# Patient Record
Sex: FEMALE | Race: WHITE | NOT HISPANIC OR LATINO | Employment: PART TIME | URBAN - METROPOLITAN AREA
[De-identification: names, ages, dates, MRNs, and addresses within clinical notes are randomized per-mention and may not be internally consistent; named-entity substitution may affect disease eponyms.]

---

## 2017-04-20 ENCOUNTER — HOSPITAL ENCOUNTER (OUTPATIENT)
Dept: MAMMOGRAPHY | Facility: MEDICAL CENTER | Age: 47
Discharge: HOME | End: 2017-04-20
Payer: COMMERCIAL

## 2017-04-20 DIAGNOSIS — Z12.31 SCREENING MAMMOGRAM, ENCOUNTER FOR: ICD-10-CM

## 2017-04-20 DIAGNOSIS — E78.00 PURE HYPERCHOLESTEROLEMIA: ICD-10-CM

## 2017-04-20 DIAGNOSIS — E55.9 VITAMIN D DEFICIENCY: ICD-10-CM

## 2017-04-20 DIAGNOSIS — Z12.31 ENCOUNTER FOR SCREENING MAMMOGRAM FOR MALIGNANT NEOPLASM OF BREAST: ICD-10-CM

## 2017-04-20 DIAGNOSIS — R92.2 INCONCLUSIVE MAMMOGRAM: ICD-10-CM

## 2017-04-20 PROCEDURE — 77063 BREAST TOMOSYNTHESIS BI: CPT

## 2017-04-20 PROCEDURE — G0202 SCR MAMMO BI INCL CAD: HCPCS

## 2017-05-10 ENCOUNTER — ALLSCRIPTS OFFICE VISIT (OUTPATIENT)
Dept: OTHER | Facility: OTHER | Age: 47
End: 2017-05-10

## 2017-05-10 PROCEDURE — 87624 HPV HI-RISK TYP POOLED RSLT: CPT | Performed by: OBSTETRICS & GYNECOLOGY

## 2017-05-10 PROCEDURE — G0145 SCR C/V CYTO,THINLAYER,RESCR: HCPCS | Performed by: OBSTETRICS & GYNECOLOGY

## 2017-05-11 ENCOUNTER — LAB REQUISITION (OUTPATIENT)
Dept: LAB | Facility: HOSPITAL | Age: 47
End: 2017-05-11
Payer: COMMERCIAL

## 2017-05-11 DIAGNOSIS — Z11.51 ENCOUNTER FOR SCREENING FOR HUMAN PAPILLOMAVIRUS (HPV): ICD-10-CM

## 2017-05-15 LAB — HPV RRNA GENITAL QL NAA+PROBE: NORMAL

## 2017-05-16 LAB
LAB AP GYN PRIMARY INTERPRETATION: NORMAL
Lab: NORMAL

## 2017-08-16 ENCOUNTER — GENERIC CONVERSION - ENCOUNTER (OUTPATIENT)
Dept: OTHER | Facility: OTHER | Age: 47
End: 2017-08-16

## 2018-01-09 NOTE — MISCELLANEOUS
Message   Recorded as Task   Date: 08/23/2017 03:36 PM, Created By: Krysta Chu   Task Name: Follow Up   Assigned To: Juliocesar Pierre   Regarding Patient: Jered Crockett, Status: In Progress   RonaldClair Rut - 23 Aug 2017 3:36 PM     TASK CREATED  Inform pt vitamin D level WNL  Jamila Ashford continue supplementation  Cholesterol only slightly elevated(good cholesterol-HDL is above average)  Continue low-fat diet, exercise  Will repeat lipid profile after next annual visit  Sally Resendez - 23 Aug 2017 3:38 PM     TASK IN PROGRESS   Sally Resendez - 23 Aug 2017 3:41 PM     TASK EDITED  LMOM to cb for b/w results  ext: Tory Murcia - 24 Aug 2017 4:15 PM     TASK EDITED  LMOM to cb for results  ext: Joan8   Alee Rojas - 25 Aug 2017 7:58 AM     TASK EDITED  lm for pt with rk's instructions    tcb with any questions        Active Problems    1  Anxiety (300 00) (F41 9)   2  Dense breasts (793 82) (R92 2)   3  Encounter for gynecological examination without abnormal finding (V72 31) (Z01 419)   4  Encounter for routine gynecological examination (V72 31) (Z01 419)   5  Encounter for screening mammogram for malignant neoplasm of breast (V76 12)   (Z12 31)   6  Fatigue (780 79) (R53 83)   7  Hypercholesterolemia (272 0) (E78 00)   8  Screening for human papillomavirus (HPV) (V73 81) (Z11 51)   9  Vitamin D deficiency (268 9) (E55 9)    Current Meds   1  ALPRAZolam 0 5 MG Oral Tablet; TAKE 1 TABLET BY MOUTH 3 TIMES A DAY AS   NEEDED; Therapy: [id number] to (Evaluate:59Bza1429); Last Rx:10May2017 Ordered   2  [facility] Fish Oil 1000 MG Oral Capsule; Therapy: ([place]) to Recorded   3  Magnesium 300 MG Oral Capsule; TAKE 1 CAPSULE DAILY; Therapy: (Recorded:08Apr2015) to Recorded   4  Probiotic CAPS; Therapy: ([place]) to Recorded   5  Xanax 0 5 MG Oral Tablet (ALPRAZolam); TAKE 1 TABLET DAILY AS NEEDED; Therapy: (Recorded:08Apr2015) to Recorded    Allergies    1   Vicodin ES TABS    Signatures   Electronically signed by : Ben Onofre, ; Aug 25 2017  7:59AM EST                       (Author)

## 2018-01-13 VITALS
BODY MASS INDEX: 25.66 KG/M2 | WEIGHT: 154 LBS | DIASTOLIC BLOOD PRESSURE: 98 MMHG | HEIGHT: 65 IN | SYSTOLIC BLOOD PRESSURE: 142 MMHG

## 2018-01-15 NOTE — MISCELLANEOUS
Message   Recorded as Task   Date: 04/08/2016 09:28 AM, Created By: Gilbert Kim   Task Name: Follow Up   Assigned To: Arnaldo Anders   Regarding Patient: Eren Cabrera, Status: In Progress   Abrahamleathacolt Crossett - 08 Apr 2016 9:28 AM     TASK CREATED  inform pt cholesterol elevated, vitamin D low    begin vit D3 2000IU daily, start low- fat diet   send order to repeat lipid profile, vit D 6 months   Jeanna Rojas - 08 Apr 2016 9:34 AM     TASK IN PROGRESS   Jeanna Rojas - 08 Apr 2016 9:37 AM     TASK EDITED  lm for pt tcb for results and further instructions    order placed in allscripts for 6 month f/u lipid and vit d   Jeanna Rojas - 08 Apr 2016 9:43 AM     TASK EDITED  lab slip mailed to pt   BobDiane - 08 Apr 2016 10:54 AM     TASK EDITED  pt iinformed        Active Problems    1  Anxiety (300 00) (F41 9)   2  Encounter for routine gynecological examination (V72 31) (Z01 419)   3  Encounter for screening mammogram for malignant neoplasm of breast (V76 12)   (Z12 31)   4  Fatigue (780 79) (R53 83)   5  Hypercholesterolemia (272 0) (E78 0)   6  Screening for human papillomavirus (HPV) (V73 81) (Z11 51)   7  Vitamin D deficiency (268 9) (E55 9)    Current Meds   1  ALPRAZolam 0 5 MG Oral Tablet; TAKE ONE TABLET BY MOUTH THREE TIMES DAILY   AS NEEDED; Therapy: [id number] to (Evaluate:08Nov2015); Last Rx:[id number] Ordered   2  [facility] Fish Oil 1000 MG Oral Capsule; Therapy: (Mekhi Veras) to Recorded   3  Magnesium 300 MG Oral Capsule; TAKE 1 CAPSULE DAILY; Therapy: (Recorded:08Apr2015) to Recorded   4  Probiotic CAPS; Therapy: (Mekhi Veras) to Recorded   5  Xanax 0 5 MG Oral Tablet (ALPRAZolam); TAKE 1 TABLET DAILY AS NEEDED; Therapy: (Recorded:08Apr2015) to Recorded   6  Xanax 1 MG Oral Tablet (ALPRAZolam); Therapy: (Recorded:27Mar2013) to Recorded    Allergies    1   Vicodin ES TABS    Signatures   Electronically signed by : Jones Giordano, ; Apr 8 2016 10:54AM EST (Author)

## 2018-04-20 DIAGNOSIS — Z12.31 ENCOUNTER FOR SCREENING MAMMOGRAM FOR MALIGNANT NEOPLASM OF BREAST: ICD-10-CM

## 2018-05-14 ENCOUNTER — ANNUAL EXAM (OUTPATIENT)
Dept: OBGYN CLINIC | Facility: CLINIC | Age: 48
End: 2018-05-14
Payer: COMMERCIAL

## 2018-05-14 ENCOUNTER — TELEPHONE (OUTPATIENT)
Dept: OBGYN CLINIC | Facility: CLINIC | Age: 48
End: 2018-05-14

## 2018-05-14 VITALS
BODY MASS INDEX: 26.06 KG/M2 | DIASTOLIC BLOOD PRESSURE: 80 MMHG | HEIGHT: 65 IN | WEIGHT: 156.4 LBS | SYSTOLIC BLOOD PRESSURE: 124 MMHG

## 2018-05-14 DIAGNOSIS — Z01.411 ENCOUNTER FOR GYNECOLOGICAL EXAMINATION WITH ABNORMAL FINDING: Primary | ICD-10-CM

## 2018-05-14 DIAGNOSIS — R53.83 FATIGUE, UNSPECIFIED TYPE: ICD-10-CM

## 2018-05-14 DIAGNOSIS — F41.9 ANXIETY: Primary | ICD-10-CM

## 2018-05-14 DIAGNOSIS — Z12.31 ENCOUNTER FOR SCREENING MAMMOGRAM FOR MALIGNANT NEOPLASM OF BREAST: ICD-10-CM

## 2018-05-14 PROCEDURE — 99396 PREV VISIT EST AGE 40-64: CPT | Performed by: OBSTETRICS & GYNECOLOGY

## 2018-05-14 RX ORDER — ALPRAZOLAM 0.5 MG/1
0.5 TABLET ORAL 3 TIMES DAILY PRN
Qty: 30 TABLET | Refills: 0 | Status: SHIPPED | OUTPATIENT
Start: 2018-05-14 | End: 2020-11-18 | Stop reason: HOSPADM

## 2018-05-14 RX ORDER — ALPRAZOLAM 0.5 MG/1
1 TABLET ORAL 3 TIMES DAILY PRN
COMMUNITY
Start: 2013-08-21 | End: 2018-05-14 | Stop reason: SDUPTHER

## 2018-05-14 RX ORDER — MAGNESIUM OXIDE/MAG AA CHELATE 300 MG
1 CAPSULE ORAL DAILY
COMMUNITY

## 2018-05-14 NOTE — TELEPHONE ENCOUNTER
Pt saw Dr Irish Peoples today for yearly - she forgot  To request rx for xanax for a year  The doctor usually orders it for her  [facility] [address]

## 2018-05-14 NOTE — PROGRESS NOTES
Assessment/Plan:    No problem-specific Assessment & Plan notes found for this encounter  Diagnoses and all orders for this visit:    Encounter for screening mammogram for malignant neoplasm of breast  -     Mammo screening bilateral w 3d & cad; Future  -     TSH, 3rd generation    Fatigue, unspecified type  -     CBC  -     Lipid panel; Future    Encounter for gynecological examination with abnormal finding    Other orders  -     ALPRAZolam (XANAX) 0 5 mg tablet; Take 1 tablet by mouth 3 (three) times a day as needed  -     Magnesium 300 MG CAPS; Take 1 capsule by mouth daily  -     Probiotic Product (PROBIOTIC-10) CAPS; Take by mouth        Annual examination was completed  Mammogram was ordered  We discussed laboratory studies to obtain for her menses  She was also offered a trial of tranexamic acid she declines at this time she will think about it  Patient otherwise return to the office in 1 year pending above results  Subjective:      Patient ID: Jenna Arauz is a 41 y o  female  Patient returns for annual gyn visit  She continues to note menses that are well time but heavy in flow on days 1 and 2  She has some fatigue  She is not interested in any interventions to help with her menses  Gynecologic Exam         The following portions of the patient's history were reviewed and updated as appropriate:   She  has a past medical history of Depression and Mitral valve disorder  She   Patient Active Problem List    Diagnosis Date Noted    Encounter for screening mammogram for malignant neoplasm of breast 2018    Fatigue 2018    Encounter for gynecological examination with abnormal finding 2018     She  has a past surgical history that includes  section; Induced ; and Tonsillectomy  Her family history includes Heart disease in her family; Hypertension in her family; No Known Problems in her mother; Tuberculosis in her family    She  reports that she has never smoked  She has never used smokeless tobacco  She reports that she drinks alcohol  Her drug history is not on file  Current Outpatient Prescriptions   Medication Sig Dispense Refill    ALPRAZolam (XANAX) 0 5 mg tablet Take 1 tablet by mouth 3 (three) times a day as needed      Magnesium 300 MG CAPS Take 1 capsule by mouth daily      Probiotic Product (PROBIOTIC-10) CAPS Take by mouth       No current facility-administered medications for this visit  No current outpatient prescriptions on file prior to visit  No current facility-administered medications on file prior to visit  She is allergic to sulfa antibiotics       Review of Systems   All other systems reviewed and are negative  Objective:      /80 (BP Location: Left arm, Patient Position: Sitting, Cuff Size: Standard)   Ht 5' 4 5" (1 638 m)   Wt 70 9 kg (156 lb 6 4 oz)   LMP 05/14/2018   BMI 26 43 kg/m²          Physical Exam   Constitutional: She is oriented to person, place, and time  She appears well-developed and well-nourished  HENT:   Head: Normocephalic and atraumatic  Nose: Nose normal    Eyes: EOM are normal  Pupils are equal, round, and reactive to light  Neck: Normal range of motion  Neck supple  No thyromegaly present  Cardiovascular: Normal rate and regular rhythm  Pulmonary/Chest: Effort normal and breath sounds normal  No respiratory distress  Breasts no masses, tenderness, skin changes   Abdominal: Soft  Bowel sounds are normal  She exhibits no distension and no mass  There is no tenderness  Hernia confirmed negative in the right inguinal area and confirmed negative in the left inguinal area  Genitourinary: Vagina normal and uterus normal  No breast swelling, tenderness, discharge or bleeding  Pelvic exam was performed with patient supine  No labial fusion  There is no rash, tenderness, lesion or injury on the right labia  There is no rash, tenderness, lesion or injury on the left labia  Cervix exhibits no motion tenderness, no discharge and no friability  Genitourinary Comments: Ext genitalia nl female w/o lesions  Vag healthy without lesions or discharge  Cervix healthy w/o lesions or discharge  uterus nl size, NT, no mass  Adnexa NT, no mass   Musculoskeletal: Normal range of motion  She exhibits no edema  Lymphadenopathy:        Right: No inguinal adenopathy present  Left: No inguinal adenopathy present  Neurological: She is alert and oriented to person, place, and time  She has normal reflexes  Skin: Skin is warm and dry  No rash noted  Psychiatric: She has a normal mood and affect  Her behavior is normal  Thought content normal    Nursing note and vitals reviewed

## 2019-03-19 ENCOUNTER — TELEPHONE (OUTPATIENT)
Dept: OBGYN CLINIC | Facility: CLINIC | Age: 49
End: 2019-03-19

## 2019-03-19 DIAGNOSIS — E78.00 ELEVATED LDL CHOLESTEROL LEVEL: Primary | ICD-10-CM

## 2019-03-19 NOTE — TELEPHONE ENCOUNTER
----- Message from Bang Justin DO sent at 3/18/2019 10:32 PM EDT -----  Please call the patient regarding her abnormal result  Total cholesterol elevated; bad(LDL cholesterol) also elevated  CBC, TSH WNL  Please order repeat lipid profile for 6 months  Advise low-fat diet, exercise

## 2019-03-19 NOTE — TELEPHONE ENCOUNTER
Spoke with pt - she is aware of her b/w results and recommendations  Order for the 6 month f/u on for the lipid b/w in pt chart

## 2019-05-29 ENCOUNTER — ANNUAL EXAM (OUTPATIENT)
Dept: OBGYN CLINIC | Facility: CLINIC | Age: 49
End: 2019-05-29
Payer: COMMERCIAL

## 2019-05-29 VITALS
HEIGHT: 64 IN | DIASTOLIC BLOOD PRESSURE: 72 MMHG | SYSTOLIC BLOOD PRESSURE: 103 MMHG | BODY MASS INDEX: 27.14 KG/M2 | WEIGHT: 159 LBS

## 2019-05-29 DIAGNOSIS — Z12.39 SCREENING FOR MALIGNANT NEOPLASM OF BREAST: ICD-10-CM

## 2019-05-29 DIAGNOSIS — Z01.419 ENCOUNTER FOR GYNECOLOGICAL EXAMINATION (GENERAL) (ROUTINE) WITHOUT ABNORMAL FINDINGS: Primary | ICD-10-CM

## 2019-05-29 DIAGNOSIS — R92.2 DENSE BREAST: ICD-10-CM

## 2019-05-29 DIAGNOSIS — E78.00 ELEVATED LDL CHOLESTEROL LEVEL: ICD-10-CM

## 2019-05-29 PROBLEM — R92.30 DENSE BREAST: Status: ACTIVE | Noted: 2019-05-29

## 2019-05-29 PROCEDURE — 99396 PREV VISIT EST AGE 40-64: CPT | Performed by: OBSTETRICS & GYNECOLOGY

## 2019-08-26 ENCOUNTER — HOSPITAL ENCOUNTER (OUTPATIENT)
Dept: MAMMOGRAPHY | Facility: CLINIC | Age: 49
Discharge: HOME | End: 2019-08-26
Payer: COMMERCIAL

## 2019-08-26 VITALS — BODY MASS INDEX: 27.14 KG/M2 | HEIGHT: 64 IN | WEIGHT: 159 LBS

## 2019-08-26 DIAGNOSIS — R92.2 DENSE BREAST: ICD-10-CM

## 2019-08-26 DIAGNOSIS — Z12.39 SCREENING FOR MALIGNANT NEOPLASM OF BREAST: ICD-10-CM

## 2019-08-26 PROCEDURE — 77063 BREAST TOMOSYNTHESIS BI: CPT

## 2019-08-26 PROCEDURE — 77067 SCR MAMMO BI INCL CAD: CPT

## 2020-07-26 ENCOUNTER — TELEPHONE (OUTPATIENT)
Dept: OTHER | Facility: OTHER | Age: 50
End: 2020-07-26

## 2020-07-26 NOTE — TELEPHONE ENCOUNTER
Your test for COVID-19, also known as novel coronavirus, came back negative  You do not have COVID-19  If you have any additional questions, we can schedule a virtual visit for you with a provider or [facility] [phone] option 7 for care advice  For additional information , please visit the Coronavirus FAQ on the [address]  ([employer])      Patient refused an appointment with a provider

## 2020-07-26 NOTE — TELEPHONE ENCOUNTER
The patient was called for notification of a test result for COVID-19  The patient did not answer the phone and a voicemail was left requesting a call back to [phone], Option 7

## 2020-09-22 ENCOUNTER — ANNUAL EXAM (OUTPATIENT)
Dept: OBGYN CLINIC | Facility: CLINIC | Age: 50
End: 2020-09-22
Payer: COMMERCIAL

## 2020-09-22 VITALS
BODY MASS INDEX: 28.31 KG/M2 | SYSTOLIC BLOOD PRESSURE: 134 MMHG | WEIGHT: 165.8 LBS | HEIGHT: 64 IN | TEMPERATURE: 96.6 F | DIASTOLIC BLOOD PRESSURE: 72 MMHG

## 2020-09-22 DIAGNOSIS — Z01.419 ENCOUNTER FOR GYNECOLOGICAL EXAMINATION WITHOUT ABNORMAL FINDING: Primary | ICD-10-CM

## 2020-09-22 DIAGNOSIS — Z12.31 ENCOUNTER FOR SCREENING MAMMOGRAM FOR MALIGNANT NEOPLASM OF BREAST: ICD-10-CM

## 2020-09-22 DIAGNOSIS — Z12.4 CERVICAL CANCER SCREENING: ICD-10-CM

## 2020-09-22 DIAGNOSIS — R53.83 FATIGUE, UNSPECIFIED TYPE: ICD-10-CM

## 2020-09-22 DIAGNOSIS — Z11.51 SCREENING FOR HUMAN PAPILLOMAVIRUS (HPV): ICD-10-CM

## 2020-09-22 DIAGNOSIS — F41.8 SITUATIONAL ANXIETY: ICD-10-CM

## 2020-09-22 DIAGNOSIS — Z13.9 ENCOUNTER FOR HEALTH-RELATED SCREENING: ICD-10-CM

## 2020-09-22 PROCEDURE — 99396 PREV VISIT EST AGE 40-64: CPT | Performed by: OBSTETRICS & GYNECOLOGY

## 2020-09-22 RX ORDER — ALPRAZOLAM 0.25 MG/1
0.25 TABLET ORAL
Qty: 10 TABLET | Refills: 0 | Status: SHIPPED | OUTPATIENT
Start: 2020-09-22 | End: 2020-11-18 | Stop reason: HOSPADM

## 2020-09-22 RX ORDER — MULTIVITAMIN
1 TABLET ORAL DAILY
COMMUNITY

## 2020-09-22 NOTE — PROGRESS NOTES
Dinesh Guanakito  1970      CC:  Yearly exam, Prior patient of Dr Hermann George    S:  54 y o  female here for yearly exam  Her cycles are regular, not heavy or crampy  Can be heavier the 1st 2 days  She has felt fatigued, and is uncertain if this is a significant change for her  She does request routine screening lab work such as thyroid and CBC today  She is also working on some weight loss although this has been more difficult  She is doing protein shakes twice a day and her  recently brought her a [place]  We did discuss that patients to exercise regularly typically have an easier time with the menopause process  Sexual activity: She is sexually active without pain, bleeding or dryness   and monogamous  Works as a nursing aide  Daughter My Estrada and granddaughter still living at home and she helps provide   Contraception: She uses vasectomy for contraception  Last Pap 5/10/17 - Normal Cytology, Negative HPV  Last Mammo 8/26/19 - 3D, [facility] 1    We reviewed ASCCP guidelines for Pap testing today       Family hx of breast cancer: no  Family hx of ovarian cancer: no  Family hx of colon cancer: no      Current Outpatient Medications:     ALPRAZolam (XANAX) 0 5 mg tablet, Take 1 tablet (0 5 mg total) by mouth 3 (three) times a day as needed (as needed for stress/anxiety), Disp: 30 tablet, Rfl: 0    IODINE, KELP, PO, Take by mouth, Disp: , Rfl:     Magnesium 300 MG CAPS, Take 1 capsule by mouth daily, Disp: , Rfl:     Multiple Vitamin (multivitamin) tablet, Take 1 tablet by mouth daily, Disp: , Rfl:     Probiotic Product (PROBIOTIC-10) CAPS, Take by mouth, Disp: , Rfl:   Social History     Socioeconomic History    Marital status: /Civil Union     Spouse name: Not on file    Number of children: Not on file    Years of education: Not on file    Highest education level: Not on file   Occupational History    Not on file   Social Needs    Financial resource strain: Not on file    Food insecurity     Worry: Not on file     Inability: Not on file    Transportation needs     Medical: Not on file     Non-medical: Not on file   Tobacco Use    Smoking status: Never Smoker    Smokeless tobacco: Never Used   Substance and Sexual Activity    Alcohol use: Yes     Comment: social drinker    Drug use: Never    Sexual activity: Yes     Partners: Male     Birth control/protection: Male Sterilization     Comment: vasectomy   Lifestyle    Physical activity     Days per week: Not on file     Minutes per session: Not on file    Stress: Not on file   Relationships    Social connections     Talks on phone: Not on file     Gets together: Not on file     Attends Anabaptist service: Not on file     Active member of club or organization: Not on file     Attends meetings of clubs or organizations: Not on file     Relationship status: Not on file    Intimate partner violence     Fear of current or ex partner: Not on file     Emotionally abused: Not on file     Physically abused: Not on file     Forced sexual activity: Not on file   Other Topics Concern    Not on file   Social History Narrative    Daily coffee consumption, 2 cups/day     Family History   Problem Relation Age of Onset    No Known Problems Mother     Heart disease Family         Cath Mitral Valve    Hypertension Family     Tuberculosis Family     No Known Problems Father     No Known Problems Sister     No Known Problems Daughter     No Known Problems Maternal Grandmother     No Known Problems Maternal Grandfather     No Known Problems Paternal Grandmother     No Known Problems Paternal Grandfather     No Known Problems Daughter     No Known Problems Maternal Aunt     No Known Problems Maternal Aunt     No Known Problems Maternal Aunt     Lung cancer Paternal Aunt       Past Medical History:   Diagnosis Date    Depression     Mitral valve disorder         Review of Systems   Respiratory: Negative  Cardiovascular: Negative  Gastrointestinal: Negative for constipation and diarrhea  Genitourinary: Negative for difficulty urinating, pelvic pain, vaginal bleeding, vaginal discharge, itching or odor  O:  Blood pressure 134/72, temperature (!) 96 6 °F (35 9 °C), temperature source Tympanic, height 5' 4" (1 626 m), weight 75 2 kg (165 lb 12 8 oz)  Patient appears well and is not in distress  Breasts are symmetrical without mass, tenderness, nipple discharge, skin changes or adenopathy  Abdomen is soft and nontender without masses  External genitals are normal without lesions or rashes  Urethral meatus and urethra are normal  Bladder is normal to palpation  Vagina is normal without discharge or bleeding  Cervix is normal without discharge or lesion  Uterus is normal, mobile, nontender without palpable mass  Adnexa are normal, nontender, without palpable mass  A:  Yearly exam      P:   Pap and HPV done today per patient request   Mammo slip provided - patient notes she typically only goes every other year, I did tell her that I typically recommend she go annually  Screening lab work provided upon her request    Has historically been prescribed Xanax by Dr Mynor Lopes  We discussed that I typically do not prescribe this, however she was given an Rx for 10 tablets as she has not filled the script in the last year per PDMP  If she continues to request or needs refills would advise following up with her primary care provider  RTO one year for yearly exam or sooner as needed

## 2020-09-24 LAB — EXT SARS-COV-2: NOT DETECTED

## 2020-09-25 LAB
CYTOLOGIST CVX/VAG CYTO: NORMAL
DX ICD CODE: NORMAL
HPV I/H RISK 1 DNA CVX QL PROBE+SIG AMP: NEGATIVE
OTHER STN SPEC: NORMAL
PATH REPORT.FINAL DX SPEC: NORMAL
SL AMB NOTE:: NORMAL
SL AMB SPECIMEN ADEQUACY: NORMAL
SL AMB TEST METHODOLOGY: NORMAL

## 2020-11-18 ENCOUNTER — OFFICE VISIT (OUTPATIENT)
Dept: FAMILY MEDICINE CLINIC | Facility: CLINIC | Age: 50
End: 2020-11-18

## 2020-11-18 VITALS
SYSTOLIC BLOOD PRESSURE: 142 MMHG | WEIGHT: 166 LBS | RESPIRATION RATE: 18 BRPM | DIASTOLIC BLOOD PRESSURE: 88 MMHG | HEART RATE: 106 BPM | HEIGHT: 64 IN | TEMPERATURE: 98.6 F | BODY MASS INDEX: 28.34 KG/M2

## 2020-11-18 DIAGNOSIS — I34.0 NONRHEUMATIC MITRAL VALVE REGURGITATION: Primary | ICD-10-CM

## 2020-11-18 DIAGNOSIS — R53.83 FATIGUE, UNSPECIFIED TYPE: ICD-10-CM

## 2020-11-18 DIAGNOSIS — E78.00 ELEVATED CHOLESTEROL: ICD-10-CM

## 2020-11-18 PROCEDURE — 3725F SCREEN DEPRESSION PERFORMED: CPT | Performed by: NURSE PRACTITIONER

## 2020-11-18 PROCEDURE — 99203 OFFICE O/P NEW LOW 30 MIN: CPT | Performed by: NURSE PRACTITIONER

## 2020-11-18 PROCEDURE — 3008F BODY MASS INDEX DOCD: CPT | Performed by: NURSE PRACTITIONER

## 2020-11-18 PROCEDURE — 1036F TOBACCO NON-USER: CPT | Performed by: NURSE PRACTITIONER

## 2020-11-18 RX ORDER — PROPRANOLOL/HYDROCHLOROTHIAZID 40 MG-25MG
TABLET ORAL
COMMUNITY

## 2021-01-20 ENCOUNTER — OFFICE VISIT (OUTPATIENT)
Dept: FAMILY MEDICINE CLINIC | Facility: CLINIC | Age: 51
End: 2021-01-20

## 2021-01-20 VITALS
HEART RATE: 98 BPM | BODY MASS INDEX: 28.51 KG/M2 | TEMPERATURE: 96.3 F | OXYGEN SATURATION: 99 % | DIASTOLIC BLOOD PRESSURE: 80 MMHG | WEIGHT: 167 LBS | RESPIRATION RATE: 18 BRPM | HEIGHT: 64 IN | SYSTOLIC BLOOD PRESSURE: 136 MMHG

## 2021-01-20 DIAGNOSIS — R79.89 LOW VITAMIN D LEVEL: ICD-10-CM

## 2021-01-20 DIAGNOSIS — E78.00 ELEVATED CHOLESTEROL: Primary | ICD-10-CM

## 2021-01-20 PROCEDURE — 99213 OFFICE O/P EST LOW 20 MIN: CPT | Performed by: NURSE PRACTITIONER

## 2021-01-20 PROCEDURE — 3008F BODY MASS INDEX DOCD: CPT | Performed by: NURSE PRACTITIONER

## 2021-01-20 RX ORDER — MELATONIN
2000 DAILY
COMMUNITY

## 2021-01-20 NOTE — PROGRESS NOTES
Assessment/Plan:    Elevated cholesterol  Discussed diet and exercise  She wants to make lifestyle changes    DASH diet given  Low vitamin D level  Vitamin D at 34, will continue to take OTC daily vitamin D   TSH is normal         Problem List Items Addressed This Visit        Other    Elevated cholesterol - Primary     Discussed diet and exercise  She wants to make lifestyle changes    DASH diet given  Low vitamin D level     Vitamin D at 34, will continue to take OTC daily vitamin D   TSH is normal                 Subjective:      Patient ID: Freedom Whitaker is a 52 y o  female  52year old presents for check up and follow up to [facility]  She and her  both had COVID  Lost sense of taste and smell and gradually retunring  She is back to work and doing well  Labs to review  Did not get echo and will schedule it asap  History of MV regurgitation  Low vitamin D and takes daily OTC supplement  The following portions of the patient's history were reviewed and updated as appropriate: allergies, current medications, past family history, past medical history, past social history, past surgical history and problem list     Review of Systems   Constitutional: Negative  HENT: Negative  Lost sense of taste and smell due to COVID and gradually returning sensations  Respiratory: Negative  Cardiovascular: Negative  All other systems reviewed and are negative  Objective:      /80 (BP Location: Left arm, Patient Position: Sitting, Cuff Size: Large)   Pulse 98   Temp (!) 96 3 °F (35 7 °C) (Tympanic)   Resp 18   Ht 5' 4" (1 626 m)   Wt 75 8 kg (167 lb)   SpO2 99%   BMI 28 67 kg/m²          Physical Exam  Vitals signs reviewed  Constitutional:       Appearance: Normal appearance     HENT:      Right Ear: Tympanic membrane, ear canal and external ear normal       Left Ear: Tympanic membrane, ear canal and external ear normal       Mouth/Throat: Mouth: Mucous membranes are moist       Pharynx: Oropharynx is clear  Neck:      Musculoskeletal: Normal range of motion and neck supple  Cardiovascular:      Rate and Rhythm: Normal rate and regular rhythm  Heart sounds: Normal heart sounds  Pulmonary:      Effort: Pulmonary effort is normal       Breath sounds: Normal breath sounds  Skin:     General: Skin is warm and dry  Neurological:      General: No focal deficit present  Mental Status: She is alert and oriented to person, place, and time     Psychiatric:         Mood and Affect: Mood normal

## 2021-01-20 NOTE — PATIENT INSTRUCTIONS
Continue with vitamin D  Get echo asap   Diet and exercise DASH Eating Plan   WHAT YOU NEED TO KNOW:   The DASH (Dietary Approaches to Stop Hypertension) Eating Plan is designed to help prevent or lower high blood pressure  It can also help to lower LDL (bad) cholesterol and decrease your risk of heart disease  The plan is low in sodium, sugar, unhealthy fats, and total fat  It is high in potassium, calcium, magnesium, and fiber  These nutrients are added when you eat more fruits, vegetables, and whole grains  DISCHARGE INSTRUCTIONS:   Your sodium limit each day: Your dietitian will tell you how much sodium is safe for you to have each day  People with high blood pressure should have no more than 1,500 to 2,300 mg of sodium in a day  A teaspoon (tsp) of salt has 2,300 mg of sodium  This may seem like a difficult goal, but small changes to the foods you eat can make a big difference  Your healthcare provider or dietitian can help you create a meal plan that follows your sodium limit  How to limit sodium:   · Read food labels  Food labels can help you choose foods that are low in sodium  The amount of sodium is listed in milligrams (mg)  The % Daily Value (DV) column tells you how much of your daily needs are met by 1 serving of the food for each nutrient listed  Choose foods that have less than 5% of the DV of sodium  These foods are considered low in sodium  Foods that have 20% or more of the DV of sodium are considered high in sodium  Avoid foods that have more than 300 mg of sodium in each serving  Choose foods that say low-sodium, reduced-sodium, or no salt added on the food label  · Avoid salt  Do not salt food at the table, and add very little salt to foods during cooking  Use herbs and spices, such as onions, garlic, and salt-free seasonings to add flavor to foods  Try lemon or lime juice or vinegar to give foods a tart flavor   Use hot peppers or a small amount of hot pepper sauce to add a spicy flavor to foods  · Ask about salt substitutes  Ask your healthcare provider if you may use salt substitutes  Some salt substitutes have ingredients that can be harmful if you have certain health conditions  · Choose foods carefully at restaurants  Meals from restaurants, especially fast food restaurants, are often high in sodium  Some restaurants have nutrition information that tells you the amount of sodium in their foods  Ask to have your food prepared with less, or no salt  What you need to know about fats:   · Include healthy fats  Examples are unsaturated fats and omega-3 fatty acids  Unsaturated fats are found in soybean, canola, olive, or sunflower oil, and liquid and soft tub margarines  Omega-3 fatty acids are found in fatty fish, such as salmon, tuna, mackerel, and sardines  It is also found in flaxseed oil and ground flaxseed  · Avoid unhealthy fats  Do not eat unhealthy fats, such as saturated fats and trans fats  Saturated fats are found in foods that contain fat from animals  Examples are fatty meats, whole milk, butter, cream, and other dairy foods  It is also found in shortening, stick margarine, palm oil, and coconut oil  Trans fats are found in fried foods, crackers, chips, and baked goods made with margarine or shortening  Foods to include: With the DASH eating plan, you need to eat a certain number of servings from each food group  This will help you get enough of certain nutrients and limit others  The amount of servings you should eat depends on how many calories you need  Your dietitian can tell you how many calories you need  The number of servings listed next to the food groups below are for people who need about 2,000 calories each day  · Grains:  6 to 8 servings (3 of these servings should be whole-grain foods)    ?  1 slice of whole-grain bread     ? 1 ounce of dry cereal    ? ½ cup of cooked cereal, pasta, or brown rice    · Vegetables and fruits:  4 to 5 servings of fruits and 4 to 5 servings of vegetables    ? 1 medium fruit    ? ½ cup of frozen, canned (no added salt), or chopped fresh vegetables     ? ½ cup of fresh, frozen, dried, or canned fruit (canned in light syrup or fruit juice)    ? ½ cup of vegetable or fruit juice    · Dairy:  2 to 3 servings    ? 1 cup of nonfat (skim) or 1% milk    ? 1½ ounces of fat-free or low-fat cheese    ? 6 ounces of nonfat or low-fat yogurt    · Lean meat, poultry, and fish:  6 ounces or less    ? Poultry (chicken, turkey) with no skin    ? Fish (especially fatty fish, such as salmon, fresh tuna, or mackerel)    ? Lean beef and pork (loin, round, extra lean hamburger)    ? Egg whites and egg substitutes    · Nuts, seeds, and legumes:  4 to 5 servings each week    ? ½ cup of cooked beans and peas    ? 1½ ounces of unsalted nuts    ? 2 tablespoons of peanut butter or seeds    · Sweets and added sugars:  5 or less each week    ? 1 tablespoon of sugar, jelly, or jam    ? ½ cup of sorbet or gelatin    ? 1 cup of lemonade    · Fats:  2 to 3 servings each week    ? 1 teaspoon of soft margarine or vegetable oil    ? 1 tablespoon of mayonnaise    ? 2 tablespoons of salad dressing    Foods to avoid:   · Grains:      ? Baked goods, such as doughnuts, pastries, cookies, and biscuits (high in fat and sugar)    ? Mixes for cornbread and biscuits, packaged foods, such as bread stuffing, rice and pasta mixes, macaroni and cheese, and instant cereals (high in sodium)    · Fruits and vegetables:      ? Regular, canned vegetables (high in sodium)    ? Sauerkraut, pickled vegetables, and other foods prepared in brine (high in sodium)    ? Fried vegetables or vegetables in butter or high-fat sauces    ? Fruit in cream or butter sauce (high in fat)    · Dairy:      ? Whole milk, 2% milk, and cream (high in fat)    ?  Regular cheese and processed cheese (high in fat and sodium)    · Meats and protein foods:      ? Smoked or cured meat, such as corned beef, reagan, ham, hot dogs, and sausage (high in fat and sodium)    ? Canned beans and canned meats or spreads, such as potted meats, sardines, anchovies, and imitation seafood (high in sodium)    ? Deli or lunch meats, such as bologna, ham, turkey, and roast beef (high in sodium)    ? High-fat meat (T-bone steak, regular hamburger, and ribs)    ? Whole eggs and egg yolks (high in fat)    · Other:      ? Seasonings made with salt, such as garlic salt, celery salt, onion salt, seasoned salt, meat tenderizers, and monosodium glutamate (MSG)    ? Miso soup and canned or dried soup mixes (high in sodium)    ? Regular soy sauce, barbecue sauce, teriyaki sauce, steak sauce, Worcestershire sauce, and most flavored vinegars (high in sodium)    ? Regular condiments, such as mustard, ketchup, and salad dressings (high in sodium)    ? Gravy and sauces, such as Bam or cheese sauces (high in sodium and fat)    ? Drinks high in sugar, such as soda or fruit drinks    ? Snack foods, such as salted chips, popcorn, pretzels, pork rinds, salted crackers, and salted nuts    ? Frozen foods, such as dinners, entrees, vegetables with sauces, and breaded meats (high in sodium)    Other guidelines to follow:   · Maintain a healthy weight  Your risk for heart disease is higher if you are overweight  Your healthcare provider may suggest that you lose weight if you are overweight  You can lose weight by eating fewer calories and foods that have added sugars and fat  The DASH meal plan can help you do this  Decrease calories by eating smaller portions at each meal and fewer snacks  Ask your healthcare provider for more information about how to lose weight  · Exercise regularly  Regular exercise can help you reach or maintain a healthy weight  Regular exercise can also help decrease your blood pressure and improve your cholesterol levels  Get 30 minutes or more of moderate exercise each day of the week   To lose weight, get at least 60 minutes of exercise  Talk to your healthcare provider about the best exercise program for you  · Limit alcohol  Women should limit alcohol to 1 drink a day  Men should limit alcohol to 2 drinks a day  A drink of alcohol is 12 ounces of beer, 5 ounces of wine, or 1½ ounces of liquor  © Copyright [address] Information is for End User's use only and may not be sold, redistributed or otherwise used for commercial purposes  All illustrations and images included in CareNotes® are the copyrighted property of [employer]  or [address] [place], [place] [address]melany travis   The above information is an  only  It is not intended as medical advice for individual conditions or treatments  Talk to your doctor, nurse or pharmacist before following any medical regimen to see if it is safe and effective for you

## 2021-01-21 PROBLEM — R79.89 LOW VITAMIN D LEVEL: Status: ACTIVE | Noted: 2021-01-21

## 2021-02-10 ENCOUNTER — VBI (OUTPATIENT)
Dept: ADMINISTRATIVE | Facility: OTHER | Age: 51
End: 2021-02-10

## 2021-05-12 ENCOUNTER — OFFICE VISIT (OUTPATIENT)
Dept: FAMILY MEDICINE CLINIC | Facility: CLINIC | Age: 51
End: 2021-05-12

## 2021-05-12 VITALS
BODY MASS INDEX: 28.92 KG/M2 | WEIGHT: 169.4 LBS | RESPIRATION RATE: 18 BRPM | HEART RATE: 82 BPM | DIASTOLIC BLOOD PRESSURE: 98 MMHG | SYSTOLIC BLOOD PRESSURE: 140 MMHG | HEIGHT: 64 IN | TEMPERATURE: 98.5 F | OXYGEN SATURATION: 99 %

## 2021-05-12 DIAGNOSIS — R00.2 HEART PALPITATIONS: Primary | ICD-10-CM

## 2021-05-12 DIAGNOSIS — F41.1 GENERALIZED ANXIETY DISORDER: ICD-10-CM

## 2021-05-12 DIAGNOSIS — R53.83 FATIGUE, UNSPECIFIED TYPE: ICD-10-CM

## 2021-05-12 DIAGNOSIS — R03.0 ELEVATED BLOOD PRESSURE, SITUATIONAL: ICD-10-CM

## 2021-05-12 PROCEDURE — 1036F TOBACCO NON-USER: CPT | Performed by: NURSE PRACTITIONER

## 2021-05-12 PROCEDURE — 99213 OFFICE O/P EST LOW 20 MIN: CPT | Performed by: NURSE PRACTITIONER

## 2021-05-12 RX ORDER — ESCITALOPRAM OXALATE 5 MG/1
5 TABLET ORAL DAILY
Qty: 30 TABLET | Refills: 5 | Status: SHIPPED | OUTPATIENT
Start: 2021-05-12 | End: 2021-10-04

## 2021-05-12 NOTE — ASSESSMENT & PLAN NOTE
Patient will keep a log and will follow up and will consider antihypertensive therapy if needed  Patient does not want to start medication today

## 2021-05-12 NOTE — PATIENT INSTRUCTIONS
Keep a log of BP and activities and log of palpitations and bring to next visit  Make an appointment with Dr Basilio Robles for palpitations    Start Lexapro one a day   Keep regular appointment in July

## 2021-05-12 NOTE — PROGRESS NOTES
Assessment/Plan:    Elevated blood pressure, situational  Patient will keep a log and will follow up and will consider antihypertensive therapy if needed  Patient does not want to start medication today  Heart palpitations  Referral to cardiology  For holter monitor  Cut out caffeine and alcohol      Generalized anxiety disorder  Start on 5 mg a day Lexapro         Problem List Items Addressed This Visit        Cardiovascular and Mediastinum    Elevated blood pressure, situational     Patient will keep a log and will follow up and will consider antihypertensive therapy if needed  Patient does not want to start medication today  Other    Fatigue    Relevant Orders    CBC and differential    Lyme Antibody Profile with reflex to WB    Heart palpitations - Primary     Referral to cardiology  For holter monitor  Cut out caffeine and alcohol           Relevant Orders    Ambulatory referral to Cardiology    Generalized anxiety disorder     Start on 5 mg a day Lexapro         Relevant Medications    escitalopram (LEXAPRO) 5 mg tablet            Subjective:      Patient ID: Moy Gotti is a 52 y o  female  52year old presents with c/o fatigue, anxiety and palpitations    She had COVID in December  Palpitations occurring frequently, approximately 1-2 times a week and increase in BP and HR  She has a lot of stress and feels anxious  She did drink alcohol and this may have caused the palpitations that occurred last week  She does exercise bike and this does not cause her palpitations  She did a 5K walk May 1st and did not have palpitations  Currently, is not experiencing any palpitations  BP elevated in office today but states she takes it at home and < 140/90  She has her daughter and her baby living with them  She is a nursing assistant at [employer]  I ordered an echocardiogram and she set it up  Stopped drinking coffee            The following portions of the patient's history were reviewed and updated as appropriate: allergies, current medications, past family history, past medical history, past social history, past surgical history and problem list     Review of Systems   Constitutional: Positive for fatigue  Respiratory: Negative  Cardiovascular:        See HPI   Psychiatric/Behavioral: The patient is nervous/anxious  All other systems reviewed and are negative  Objective:      /98 (BP Location: Left arm, Patient Position: Sitting, Cuff Size: Standard)   Pulse 82   Temp 98 5 °F (36 9 °C) (Temporal)   Resp 18   Ht 5' 4" (1 626 m)   Wt 76 8 kg (169 lb 6 4 oz)   SpO2 99%   BMI 29 08 kg/m²          Physical Exam  Vitals signs reviewed  Constitutional:       Appearance: Normal appearance  Cardiovascular:      Rate and Rhythm: Normal rate and regular rhythm  Heart sounds: Normal heart sounds  Pulmonary:      Effort: Pulmonary effort is normal       Breath sounds: Normal breath sounds  Neurological:      General: No focal deficit present  Mental Status: She is alert and oriented to person, place, and time  Psychiatric:         Mood and Affect: Mood normal          Behavior: Behavior normal          Thought Content:  Thought content normal          Judgment: Judgment normal

## 2021-05-17 PROBLEM — R00.2 HEART PALPITATIONS: Status: ACTIVE | Noted: 2021-05-17

## 2021-05-17 PROBLEM — F41.1 GENERALIZED ANXIETY DISORDER: Status: ACTIVE | Noted: 2021-05-17

## 2021-06-15 ENCOUNTER — HOSPITAL ENCOUNTER (OUTPATIENT)
Dept: NON INVASIVE DIAGNOSTICS | Facility: HOSPITAL | Age: 51
Discharge: HOME | End: 2021-06-15
Payer: COMMERCIAL

## 2021-06-15 DIAGNOSIS — I34.0 NONRHEUMATIC MITRAL VALVE REGURGITATION: ICD-10-CM

## 2021-06-15 PROCEDURE — 93306 TTE W/DOPPLER COMPLETE: CPT

## 2021-06-15 PROCEDURE — 93306 TTE W/DOPPLER COMPLETE: CPT | Performed by: INTERNAL MEDICINE

## 2021-06-16 ENCOUNTER — OFFICE VISIT (OUTPATIENT)
Dept: CARDIOLOGY CLINIC | Facility: CLINIC | Age: 51
End: 2021-06-16
Payer: COMMERCIAL

## 2021-06-16 VITALS
DIASTOLIC BLOOD PRESSURE: 82 MMHG | HEART RATE: 95 BPM | SYSTOLIC BLOOD PRESSURE: 124 MMHG | BODY MASS INDEX: 29.04 KG/M2 | HEIGHT: 64 IN | WEIGHT: 170.1 LBS

## 2021-06-16 DIAGNOSIS — R03.0 ELEVATED BLOOD PRESSURE, SITUATIONAL: Primary | ICD-10-CM

## 2021-06-16 DIAGNOSIS — R00.2 HEART PALPITATIONS: ICD-10-CM

## 2021-06-16 PROCEDURE — 93000 ELECTROCARDIOGRAM COMPLETE: CPT | Performed by: INTERNAL MEDICINE

## 2021-06-16 PROCEDURE — 99203 OFFICE O/P NEW LOW 30 MIN: CPT | Performed by: INTERNAL MEDICINE

## 2021-06-16 PROCEDURE — 3008F BODY MASS INDEX DOCD: CPT | Performed by: NURSE PRACTITIONER

## 2021-06-16 PROCEDURE — 1036F TOBACCO NON-USER: CPT | Performed by: INTERNAL MEDICINE

## 2021-06-16 NOTE — PROGRESS NOTES
Consultation - Cardiology   Domenica Ramos 52 y o  female MRN: [id number]  Unit/Bed#:  Encounter: [id number]  Physician Requesting Consult: No att  providers found  Reason for Consult / Principal Problem: palpitations      Assessment:  1  Palpitations  2  Situational HTN    Plan:   I did not feel that she is having any significant cardiac arrhythmias  She is very active and exercises regularly  She does not have significant cardiac risk factors  I do not recommend any cardiac medications or further testing  She is likely having benign PACs or PVCs  With a structurally normal heart by echo, no treatment is needed  RTO as needed  History of Present Illness     HPI: Domenica Ramos is a 52y o  year old female who denies any past cardiac history  She denies HTN, DM, smoking, excessive cardiac use, thyroid disease, lung disease  She did have and echo done over 10 years ago for palpitations which showed a normal EF with no significant valve disease  She is seen for occasional palpitations which she gets mostly when stressed or anxious  Recent echo again was unremarkable  ECG today is normal     Her palpitations last for only seconds and she denies associated CP, SOB, dizziness, syncope  She has no FH of CAD  Her BP is controlled but does increase under stress  She was recently started on low dose Lexapro which has helped her        Review of Systems:    Alert awake oriented, comfortable, denies any complaints  No fevers chills nausea vomiting  No weakness, dizziness, seizures  no cough, shortness of breath, or wheezing  Denies any palpitations, chest pain, diaphoresis  Denies leg edema, pain or paresthesias  Denies any skin rashes  Denies abdominal pain, bloody stools, masses  Denies any depression or suicidal ideations      Historical Information   Past Medical History:   Diagnosis Date    Depression     Mitral valve disorder      Past Surgical History:   Procedure Laterality Date     SECTION  INDUCED       By dilation and evacuation    TONSILLECTOMY       Social History     Substance and Sexual Activity   Alcohol Use Yes    Comment: social drinker     Social History     Substance and Sexual Activity   Drug Use Never     Social History     Tobacco Use   Smoking Status Never Smoker   Smokeless Tobacco Never Used     Family History: non-contributory    Meds/Allergies   all current active meds have been reviewed  Allergies   Allergen Reactions    Sulfa Antibiotics Shortness Of Breath    Hydrocodone-Acetaminophen        Objective   Vitals: Blood pressure 124/82, pulse 95, height 5' 4" (1 626 m), weight 77 2 kg (170 lb 1 6 oz)  , Body mass index is 29 2 kg/m²  ,   Weight (last 2 days)     Date/Time   Weight    21 1318   77 2 (170 1)                        Physical Exam:  GEN: Deryl Bloch appears well, alert and oriented x 3, pleasant and cooperative   HEENT: pupils equal, round, and reactive to light; extraocular muscles intact  NECK: supple, no carotid bruits   HEART: regular rhythm, normal S1 and S2, no murmurs, clicks, gallops or rubs   LUNGS: clear to auscultation bilaterally; no wheezes, rales, or rhonchi   ABDOMEN: normal bowel sounds, soft, no tenderness, no distention  EXTREMITIES: peripheral pulses normal; no clubbing, cyanosis, or edema  NEURO: no focal findings   SKIN: normal without suspicious lesions on exposed skin    Lab Results:   Office Visit on 2021   Component Date Value Ref Range Status    Interpretation 2021    Final    NSR,                       Imaging: I have personally reviewed pertinent reports

## 2021-07-27 ENCOUNTER — TELEPHONE (OUTPATIENT)
Dept: FAMILY MEDICINE CLINIC | Facility: CLINIC | Age: 51
End: 2021-07-27

## 2021-07-28 ENCOUNTER — TELEPHONE (OUTPATIENT)
Dept: FAMILY MEDICINE CLINIC | Facility: CLINIC | Age: 51
End: 2021-07-28

## 2021-07-28 ENCOUNTER — OFFICE VISIT (OUTPATIENT)
Dept: FAMILY MEDICINE CLINIC | Facility: CLINIC | Age: 51
End: 2021-07-28

## 2021-07-28 VITALS
WEIGHT: 176 LBS | BODY MASS INDEX: 30.05 KG/M2 | SYSTOLIC BLOOD PRESSURE: 128 MMHG | HEIGHT: 64 IN | DIASTOLIC BLOOD PRESSURE: 80 MMHG | RESPIRATION RATE: 18 BRPM | HEART RATE: 90 BPM | TEMPERATURE: 97.8 F

## 2021-07-28 DIAGNOSIS — F41.1 GENERALIZED ANXIETY DISORDER: Primary | ICD-10-CM

## 2021-07-28 DIAGNOSIS — R03.0 ELEVATED BLOOD PRESSURE, SITUATIONAL: ICD-10-CM

## 2021-07-28 PROCEDURE — 99212 OFFICE O/P EST SF 10 MIN: CPT | Performed by: NURSE PRACTITIONER

## 2021-07-28 PROCEDURE — 1036F TOBACCO NON-USER: CPT | Performed by: NURSE PRACTITIONER

## 2021-07-28 PROCEDURE — 3008F BODY MASS INDEX DOCD: CPT | Performed by: INTERNAL MEDICINE

## 2021-07-28 PROCEDURE — 3008F BODY MASS INDEX DOCD: CPT | Performed by: NURSE PRACTITIONER

## 2021-07-28 NOTE — TELEPHONE ENCOUNTER
Rebecca Rai,  Patient checking out wanting to know if you would be willing to generate a note rejecting the Flu Vaccine due to medical reaction

## 2021-07-28 NOTE — PROGRESS NOTES
Assessment/Plan:    Generalized anxiety disorder  Continue with 5 mg Lexapro daily  Working well for anxiety    Elevated blood pressure, situational  Did keep a log and BP ranges < 140/90 at home  128/80 in the office today  Continue with low salt and diet and exercise  Problem List Items Addressed This Visit        Cardiovascular and Mediastinum    Elevated blood pressure, situational     Did keep a log and BP ranges < 140/90 at home  128/80 in the office today  Continue with low salt and diet and exercise  Other    Generalized anxiety disorder - Primary     Continue with 5 mg Lexapro daily  Working well for anxiety                 Subjective:      Patient ID: Lila Milan is a 52 y o  female  48   BP reading at home 128/80 range, doing well since starting Lexapro 5 mg daily and controlling anxiety and has not had any palpitations  Echo reviewed and he saw cardiology and no concerns  The following portions of the patient's history were reviewed and updated as appropriate: allergies, current medications, past family history, past medical history, past social history, past surgical history and problem list     Review of Systems   Constitutional: Negative  HENT: Negative  Respiratory: Negative  Cardiovascular: Negative  Psychiatric/Behavioral: Negative  All other systems reviewed and are negative  Objective:      /80 (BP Location: Left arm, Patient Position: Sitting, Cuff Size: Large)   Pulse 90   Temp 97 8 °F (36 6 °C) (Temporal)   Resp 18   Ht 5' 4" (1 626 m)   Wt 79 8 kg (176 lb)   BMI 30 21 kg/m²          Physical Exam  Vitals reviewed  Constitutional:       Appearance: Normal appearance  HENT:      Head: Normocephalic and atraumatic  Cardiovascular:      Rate and Rhythm: Normal rate and regular rhythm  Heart sounds: Normal heart sounds  Pulmonary:      Effort: Pulmonary effort is normal       Breath sounds: Normal breath sounds  Neurological:      Mental Status: She is alert  Psychiatric:         Mood and Affect: Mood normal          Behavior: Behavior normal          Thought Content:  Thought content normal          Judgment: Judgment normal

## 2021-07-28 NOTE — ASSESSMENT & PLAN NOTE
Did keep a log and BP ranges < 140/90 at home  128/80 in the office today  Continue with low salt and diet and exercise

## 2021-08-13 ENCOUNTER — TELEPHONE (OUTPATIENT)
Dept: FAMILY MEDICINE CLINIC | Facility: CLINIC | Age: 51
End: 2021-08-13

## 2021-08-13 NOTE — TELEPHONE ENCOUNTER
Patient would like a call back with her lab results  Patient states her platelets were high and is concern

## 2021-08-17 ENCOUNTER — TELEPHONE (OUTPATIENT)
Dept: FAMILY MEDICINE CLINIC | Facility: CLINIC | Age: 51
End: 2021-08-17

## 2021-08-17 DIAGNOSIS — R10.12 BILATERAL UPPER ABDOMINAL PAIN: Primary | ICD-10-CM

## 2021-08-17 DIAGNOSIS — R10.11 BILATERAL UPPER ABDOMINAL PAIN: Primary | ICD-10-CM

## 2021-08-17 NOTE — TELEPHONE ENCOUNTER
I spoke to Galileo Sommer today regarding her elevated platelets  Discussed going to hematology for a consult  She does not know of any clotting disorders in her family  The rest of her CBC was normal     She was c/o upper abdominal pain for the past week, especially the RUQ  I will order an US of the abdomen and she will call central scheduling to get the test set up

## 2021-08-31 ENCOUNTER — HOSPITAL ENCOUNTER (OUTPATIENT)
Dept: RADIOLOGY | Facility: HOSPITAL | Age: 51
Discharge: HOME | End: 2021-08-31
Payer: COMMERCIAL

## 2021-08-31 DIAGNOSIS — R10.12 BILATERAL UPPER ABDOMINAL PAIN: ICD-10-CM

## 2021-08-31 DIAGNOSIS — R10.11 BILATERAL UPPER ABDOMINAL PAIN: ICD-10-CM

## 2021-08-31 PROCEDURE — 76700 US EXAM ABDOM COMPLETE: CPT

## 2021-09-01 ENCOUNTER — OFFICE VISIT (OUTPATIENT)
Dept: FAMILY MEDICINE CLINIC | Facility: CLINIC | Age: 51
End: 2021-09-01

## 2021-09-01 VITALS
HEIGHT: 64 IN | BODY MASS INDEX: 30.39 KG/M2 | TEMPERATURE: 97.9 F | WEIGHT: 178 LBS | RESPIRATION RATE: 16 BRPM | HEART RATE: 74 BPM | SYSTOLIC BLOOD PRESSURE: 122 MMHG | DIASTOLIC BLOOD PRESSURE: 84 MMHG

## 2021-09-01 DIAGNOSIS — R35.0 URINE FREQUENCY: Primary | ICD-10-CM

## 2021-09-01 DIAGNOSIS — R30.0 DYSURIA: ICD-10-CM

## 2021-09-01 DIAGNOSIS — N95.1 PERIMENOPAUSE: ICD-10-CM

## 2021-09-01 DIAGNOSIS — D69.1 ABNORMAL PLATELETS (HCC): ICD-10-CM

## 2021-09-01 LAB
BILIRUB UR QL STRIP: NEGATIVE
CLARITY UR: CLEAR
COLOR UR: YELLOW
GLUCOSE UR STRIP-MCNC: NEGATIVE MG/DL
HGB UR QL STRIP.AUTO: NEGATIVE
KETONES UR STRIP-MCNC: NEGATIVE MG/DL
LEUKOCYTE ESTERASE UR QL STRIP: NEGATIVE
NITRITE UR QL STRIP: NEGATIVE
PH UR STRIP.AUTO: 7.5 [PH]
PROT UR STRIP-MCNC: NEGATIVE MG/DL
SL AMB  POCT GLUCOSE, UA: NEGATIVE
SL AMB LEUKOCYTE ESTERASE,UA: NEGATIVE
SL AMB POCT BILIRUBIN,UA: NEGATIVE
SL AMB POCT BLOOD,UA: ABNORMAL
SL AMB POCT CLARITY,UA: CLEAR
SL AMB POCT COLOR,UA: YELLOW
SL AMB POCT KETONES,UA: NEGATIVE
SL AMB POCT NITRITE,UA: NEGATIVE
SL AMB POCT PH,UA: 7.5
SL AMB POCT SPECIFIC GRAVITY,UA: 1.01
SL AMB POCT URINE PROTEIN: ABNORMAL
SL AMB POCT UROBILINOGEN: 0.2
SP GR UR STRIP.AUTO: 1.03 (ref 1–1.03)
UROBILINOGEN UR QL STRIP.AUTO: 0.2 E.U./DL

## 2021-09-01 PROCEDURE — 3008F BODY MASS INDEX DOCD: CPT | Performed by: NURSE PRACTITIONER

## 2021-09-01 PROCEDURE — 81003 URINALYSIS AUTO W/O SCOPE: CPT | Performed by: NURSE PRACTITIONER

## 2021-09-01 PROCEDURE — 99213 OFFICE O/P EST LOW 20 MIN: CPT | Performed by: NURSE PRACTITIONER

## 2021-09-01 PROCEDURE — 81002 URINALYSIS NONAUTO W/O SCOPE: CPT | Performed by: NURSE PRACTITIONER

## 2021-09-01 PROCEDURE — 1036F TOBACCO NON-USER: CPT | Performed by: NURSE PRACTITIONER

## 2021-09-01 NOTE — LETTER
September 1, 2021     Patient: Merary Goode   YOB: 1970   Date of Visit: 9/1/2021       To Whom it May Concern:    Anderson Hawthorne is under my professional care  She was seen in my office on 9/1/2021  She cannot take the flu vaccine due to bad reaction in the past  It is not recommended that she get the flu shot  If you have any questions or concerns, please don't hesitate to call           Sincerely,          ELIZABETH Healy        CC: No Recipients

## 2021-09-01 NOTE — PROGRESS NOTES
Assessment/Plan:    Abnormal platelets (HCC)  Elevated platelets last several lab tests, will repeat  Gave referral to hematology  Urine frequency  UA essentially normal except for slight protein and blood, sent to lab for culture  Perimenopause  Continue with Lexapro 5 mg daily  Menses heavier and not regular         Problem List Items Addressed This Visit        Hematopoietic and Hemostatic    Abnormal platelets (HCC)     Elevated platelets last several lab tests, will repeat  Gave referral to hematology  Relevant Orders    Ambulatory referral to Hematology / Oncology       Other    Urine frequency - Primary     UA essentially normal except for slight protein and blood, sent to lab for culture  Relevant Orders    UA w Reflex to Microscopic w Reflex to Culture - Clinic Collect (Completed)    Perimenopause     Continue with Lexapro 5 mg daily  Menses heavier and not regular           Other Visit Diagnoses     Dysuria        Relevant Orders    POCT urine dip (Completed)            Subjective:      Patient ID: Aquiles Fitzgerald is a 52 y o  female  52year old present to review US of abdomen and labs  US not yet read by radiology  [facility] History of elevated platelets and will refer her to hematology  She is getting a repeat of labs when she gets back from vacation in 2 weeks  Lyme titre is normal   C/o frequent urination which she attributes to menopause, taking 5 mg of Lexapro and this has been very effective for her and she wants to continue this therapy  UA done in the office which was essentially normal except for slight protein and slight blood, will sent to lab for culture  Went to [facility] to see if she has antibodies to COVID , had the disease and did not get the vaccine but has antibodies  and she has decided not to get the vaccine at this time  She does not get the flu shot due to severe hypersensitivity reaction in the past and I wrote a note for her to give to her employer          The following portions of the patient's history were reviewed and updated as appropriate: allergies, current medications, past family history, past medical history, past social history, past surgical history and problem list     Review of Systems   Constitutional: Negative  Respiratory: Negative  Cardiovascular: Negative  Genitourinary: Positive for urgency  All other systems reviewed and are negative  Objective:      /84 (BP Location: Left arm, Patient Position: Sitting, Cuff Size: Large)   Pulse 74   Temp 97 9 °F (36 6 °C) (Temporal)   Resp 16   Ht 5' 4" (1 626 m)   Wt 80 7 kg (178 lb)   BMI 30 55 kg/m²          Physical Exam  Vitals reviewed  Constitutional:       Appearance: Normal appearance  HENT:      Head: Normocephalic and atraumatic  Cardiovascular:      Rate and Rhythm: Normal rate and regular rhythm  Heart sounds: Normal heart sounds  Pulmonary:      Effort: Pulmonary effort is normal       Breath sounds: Normal breath sounds  Musculoskeletal:      Cervical back: Normal range of motion and neck supple  Skin:     General: Skin is warm and dry  Neurological:      Mental Status: She is alert     Psychiatric:         Mood and Affect: Mood normal

## 2021-09-02 ENCOUNTER — TELEPHONE (OUTPATIENT)
Dept: HEMATOLOGY ONCOLOGY | Facility: CLINIC | Age: 51
End: 2021-09-02

## 2021-09-24 ENCOUNTER — ANNUAL EXAM (OUTPATIENT)
Dept: OBGYN CLINIC | Facility: CLINIC | Age: 51
End: 2021-09-24
Payer: COMMERCIAL

## 2021-09-24 VITALS
DIASTOLIC BLOOD PRESSURE: 80 MMHG | SYSTOLIC BLOOD PRESSURE: 132 MMHG | BODY MASS INDEX: 30.97 KG/M2 | WEIGHT: 181.4 LBS | HEIGHT: 64 IN

## 2021-09-24 DIAGNOSIS — Z12.31 ENCOUNTER FOR SCREENING MAMMOGRAM FOR MALIGNANT NEOPLASM OF BREAST: ICD-10-CM

## 2021-09-24 DIAGNOSIS — Z12.11 COLON CANCER SCREENING: ICD-10-CM

## 2021-09-24 DIAGNOSIS — Z01.419 ENCNTR FOR GYN EXAM (GENERAL) (ROUTINE) W/O ABN FINDINGS: Primary | ICD-10-CM

## 2021-09-24 PROBLEM — Z12.39 SCREENING FOR MALIGNANT NEOPLASM OF BREAST: Status: RESOLVED | Noted: 2019-05-29 | Resolved: 2021-09-24

## 2021-09-24 PROBLEM — Z01.411 ENCOUNTER FOR GYNECOLOGICAL EXAMINATION WITH ABNORMAL FINDING: Status: RESOLVED | Noted: 2018-05-14 | Resolved: 2021-09-24

## 2021-09-24 PROCEDURE — 99396 PREV VISIT EST AGE 40-64: CPT | Performed by: PHYSICIAN ASSISTANT

## 2021-09-24 PROCEDURE — 1036F TOBACCO NON-USER: CPT | Performed by: PHYSICIAN ASSISTANT

## 2021-09-24 PROCEDURE — 3008F BODY MASS INDEX DOCD: CPT | Performed by: NURSE PRACTITIONER

## 2021-09-24 PROCEDURE — 3008F BODY MASS INDEX DOCD: CPT | Performed by: PHYSICIAN ASSISTANT

## 2021-09-24 RX ORDER — ALPRAZOLAM 0.25 MG/1
TABLET ORAL
COMMUNITY
End: 2022-01-24 | Stop reason: ALTCHOICE

## 2021-09-24 NOTE — PROGRESS NOTES
Yaz Sigala  1970      CC:  Yearly exam    S:  52 y o  female here for yearly exam  Her cycles are regular, last 2 days of moderate bleeding, saturating a pad in 2 hours  We discussed the option to use Aleve two po BID for her heavier bleeding  Sexual activity: She is sexually active without pain, bleeding or dryness  Contraception: She uses vasectomy for contraception  She works as a nurse's aide at [employer]  Last Pap 9/22/20 neg/neg  Last Mammo 8/26/19 neg  Last Colonoscopy never    We reviewed ASCCP guidelines for Pap testing today  Family hx of breast cancer: no  Family hx of ovarian cancer: no  Family hx of colon cancer: no    She says she plans to have a mammogram q 2 years "because Dr Bruna Oneill gave me the okay to go every 2 years," wants a Pap q 2 years, and refuses colonoscopy because her family doctor told her she doesn't need it  We discussed current recommendations for mammo yearly, Pap with HPV q 5 years, and colonoscopy at age 41  These remain my recommendations         Current Outpatient Medications:     ALPRAZolam (XANAX) 0 25 mg tablet, Take by mouth daily at bedtime as needed for anxiety, Disp: , Rfl:     cholecalciferol (VITAMIN D3) 1,000 units tablet, Take 2,000 Units by mouth daily, Disp: , Rfl:     COLLAGEN PO, Take by mouth, Disp: , Rfl:     escitalopram (LEXAPRO) 5 mg tablet, Take 1 tablet (5 mg total) by mouth daily, Disp: 30 tablet, Rfl: 5    IODINE, KELP, PO, Take by mouth, Disp: , Rfl:     Magnesium 300 MG CAPS, Take 1 capsule by mouth daily, Disp: , Rfl:     Multiple Vitamin (multivitamin) tablet, Take 1 tablet by mouth daily, Disp: , Rfl:     Probiotic Product (PROBIOTIC-10) CAPS, Take by mouth, Disp: , Rfl:     Turmeric 500 MG CAPS, Take by mouth, Disp: , Rfl:   Social History     Socioeconomic History    Marital status: /Civil Union     Spouse name: Not on file    Number of children: Not on file    Years of education: Not on file    Highest education level: Not on file   Occupational History    Not on file   Tobacco Use    Smoking status: Never Smoker    Smokeless tobacco: Never Used   Vaping Use    Vaping Use: Never used   Substance and Sexual Activity    Alcohol use: Yes     Comment: social drinker    Drug use: Never    Sexual activity: Yes     Partners: Male     Birth control/protection: Male Sterilization     Comment: vasectomy   Other Topics Concern    Not on file   Social History Narrative    Daily coffee consumption, 2 cups/day     Social Determinants of Health     Financial Resource Strain: Low Risk     Difficulty of Paying Living Expenses: Not hard at all   Food Insecurity: No Food Insecurity    Worried About Running Out of Food in the Last Year: Never true    Lior of Food in the Last Year: Never true   Transportation Needs: No Transportation Needs    Lack of Transportation (Medical): No    Lack of Transportation (Non-Medical):  No   Physical Activity:     Days of Exercise per Week:     Minutes of Exercise per Session:    Stress:     Feeling of Stress :    Social Connections:     Frequency of Communication with Friends and Family:     Frequency of Social Gatherings with Friends and Family:     Attends [employer] Services:     Active Member of Clubs or Organizations:     Attends Club or Organization Meetings:     Marital Status:    Intimate Partner Violence:     Fear of Current or Ex-Partner:     Emotionally Abused:     Physically Abused:     Sexually Abused:      Family History   Problem Relation Age of Onset    No Known Problems Mother     Heart disease Family         Cath Mitral Valve    Hypertension Family     Tuberculosis Family     No Known Problems Father     No Known Problems Sister     No Known Problems Daughter     No Known Problems Maternal Grandmother     No Known Problems Maternal Grandfather     No Known Problems Paternal Grandmother     No Known Problems Paternal Grandfather  No Known Problems Daughter     No Known Problems Maternal Aunt     No Known Problems Maternal Aunt     No Known Problems Maternal Aunt     Lung cancer Paternal Aunt       Past Medical History:   Diagnosis Date    Depression     Mitral valve disorder         Review of Systems   Respiratory: Negative  Cardiovascular: Negative  Gastrointestinal: Negative for constipation and diarrhea  Genitourinary: Negative for difficulty urinating, pelvic pain, vaginal bleeding, vaginal discharge, itching or odor  O:  Blood pressure 132/80, height 5' 4 17" (1 63 m), weight 82 3 kg (181 lb 6 4 oz), last menstrual period 09/02/2021  Patient appears well and is not in distress  Neck is supple without masses  Breasts are symmetrical without mass, tenderness, nipple discharge, skin changes or adenopathy  Abdomen is soft and nontender without masses  External genitals are normal without lesions or rashes  Urethral meatus and urethra are normal  Bladder is normal to palpation  Vagina is normal without discharge or bleeding  Cervix is normal without discharge or lesion  Uterus is normal, mobile, nontender without palpable mass  Adnexa are normal, nontender, without palpable mass  A:  Yearly exam      P:   Pap 2025 (she prefers 2022)   Mammo slip provided    Colonoscopy recommended, referral placed    RTO one year for yearly exam or sooner as needed

## 2021-10-02 DIAGNOSIS — F41.1 GENERALIZED ANXIETY DISORDER: ICD-10-CM

## 2021-10-04 RX ORDER — ESCITALOPRAM OXALATE 5 MG/1
TABLET ORAL
Qty: 90 TABLET | Refills: 1 | Status: SHIPPED | OUTPATIENT
Start: 2021-10-04 | End: 2022-01-24 | Stop reason: ALTCHOICE

## 2021-12-15 ENCOUNTER — HOSPITAL ENCOUNTER (OUTPATIENT)
Dept: MAMMOGRAPHY | Facility: CLINIC | Age: 51
Discharge: HOME | End: 2021-12-15
Payer: COMMERCIAL

## 2021-12-15 VITALS — BODY MASS INDEX: 30.56 KG/M2 | HEIGHT: 64 IN | WEIGHT: 179 LBS

## 2021-12-15 DIAGNOSIS — Z12.31 ENCOUNTER FOR SCREENING MAMMOGRAM FOR MALIGNANT NEOPLASM OF BREAST: ICD-10-CM

## 2021-12-15 PROCEDURE — 77067 SCR MAMMO BI INCL CAD: CPT

## 2021-12-15 PROCEDURE — 77063 BREAST TOMOSYNTHESIS BI: CPT

## 2022-01-24 ENCOUNTER — OFFICE VISIT (OUTPATIENT)
Dept: FAMILY MEDICINE CLINIC | Facility: CLINIC | Age: 52
End: 2022-01-24

## 2022-01-24 VITALS
DIASTOLIC BLOOD PRESSURE: 86 MMHG | TEMPERATURE: 97.6 F | HEIGHT: 64 IN | WEIGHT: 175 LBS | OXYGEN SATURATION: 100 % | HEART RATE: 92 BPM | SYSTOLIC BLOOD PRESSURE: 121 MMHG | BODY MASS INDEX: 29.88 KG/M2

## 2022-01-24 DIAGNOSIS — Z71.85 VACCINE COUNSELING: ICD-10-CM

## 2022-01-24 DIAGNOSIS — D69.1 ABNORMAL PLATELETS (HCC): Primary | ICD-10-CM

## 2022-01-24 DIAGNOSIS — Z12.11 SCREENING FOR COLON CANCER: ICD-10-CM

## 2022-01-24 DIAGNOSIS — E78.00 ELEVATED LDL CHOLESTEROL LEVEL: ICD-10-CM

## 2022-01-24 DIAGNOSIS — Z78.9: ICD-10-CM

## 2022-01-24 DIAGNOSIS — F41.1 GENERALIZED ANXIETY DISORDER: ICD-10-CM

## 2022-01-24 PROCEDURE — 99214 OFFICE O/P EST MOD 30 MIN: CPT | Performed by: FAMILY MEDICINE

## 2022-01-24 PROCEDURE — 1036F TOBACCO NON-USER: CPT | Performed by: FAMILY MEDICINE

## 2022-01-24 PROCEDURE — 3008F BODY MASS INDEX DOCD: CPT | Performed by: FAMILY MEDICINE

## 2022-01-24 PROCEDURE — 3725F SCREEN DEPRESSION PERFORMED: CPT | Performed by: FAMILY MEDICINE

## 2022-01-24 NOTE — ASSESSMENT & PLAN NOTE
Discussed lifestyle modification with diet and exercise    Patient has been taking low carb diet, works out on Avocadoâ„¢ every day  Will repeat lipid panel in 3 months

## 2022-01-24 NOTE — ASSESSMENT & PLAN NOTE
Stable, patient reports she was able to wean herself off Lexapro  Doing well  Will continue to monitor

## 2022-01-24 NOTE — ASSESSMENT & PLAN NOTE
Discussed benefits of Covid vaccine and that she does not have any contraindications to get vaccine at this time  Patient still hesitant to get vaccine    Discussed referral to allergist, patient will consider following up with them

## 2022-03-08 DIAGNOSIS — F41.1 GENERALIZED ANXIETY DISORDER: ICD-10-CM

## 2022-03-08 RX ORDER — ESCITALOPRAM OXALATE 5 MG/1
TABLET ORAL
Qty: 90 TABLET | Refills: 1 | OUTPATIENT
Start: 2022-03-08

## 2022-03-08 NOTE — TELEPHONE ENCOUNTER
Dr Nura Basilio I called patient twice regarding your message  Im going to keep trying to get in contact with the patient even though I leave a VM stating for her to call back   Thanks

## 2022-09-28 ENCOUNTER — ANNUAL EXAM (OUTPATIENT)
Dept: OBGYN CLINIC | Facility: CLINIC | Age: 52
End: 2022-09-28
Payer: COMMERCIAL

## 2022-09-28 VITALS
DIASTOLIC BLOOD PRESSURE: 82 MMHG | HEIGHT: 64 IN | WEIGHT: 173.4 LBS | BODY MASS INDEX: 29.6 KG/M2 | SYSTOLIC BLOOD PRESSURE: 128 MMHG

## 2022-09-28 DIAGNOSIS — Z01.419 ENCNTR FOR GYN EXAM (GENERAL) (ROUTINE) W/O ABN FINDINGS: ICD-10-CM

## 2022-09-28 DIAGNOSIS — Z12.31 ENCOUNTER FOR SCREENING MAMMOGRAM FOR MALIGNANT NEOPLASM OF BREAST: ICD-10-CM

## 2022-09-28 PROCEDURE — G0476 HPV COMBO ASSAY CA SCREEN: HCPCS | Performed by: OBSTETRICS & GYNECOLOGY

## 2022-09-28 PROCEDURE — 0503F POSTPARTUM CARE VISIT: CPT | Performed by: OBSTETRICS & GYNECOLOGY

## 2022-09-28 PROCEDURE — G0145 SCR C/V CYTO,THINLAYER,RESCR: HCPCS | Performed by: OBSTETRICS & GYNECOLOGY

## 2022-09-28 PROCEDURE — 99396 PREV VISIT EST AGE 40-64: CPT | Performed by: OBSTETRICS & GYNECOLOGY

## 2022-09-28 NOTE — PROGRESS NOTES
Isis Wilkins  1970      CC:  Yearly exam    S:  54 y o  female here for yearly exam  Her cycles are regular on monthly bases, although are shorter, 2 days heavy and then 2 very light days  Does note anxiety 1 week before her cycles  No intermenstrual bleeding  She denies vaginal discharge, itching, pelvic pain  She has not have urinary concerns, does have occasional incontinence - especially if holds it too long  No bowel concerns  No breast concerns  Does wear a panty liner  Has started to get up at night to go bathroom  New job! [employer] [address] [place] [address]  Sexual activity: She is sexually active without pain, bleeding or dryness  She is  and monogamous  She is not interested in STD screening today  Contraception: She uses vasectomy  for contraception  Last Pap: 9/22/20 - NILM, Neg HPV  Last Mammo: 12/15/21 - BIRad 1  No colonoscopy yet, has rx for FIT  We reviewed ASCCP guidelines for Pap testing today       Family hx of breast cancer: no  Family hx of ovarian cancer: no  Family hx of colon cancer: no      Current Outpatient Medications:     cholecalciferol (VITAMIN D3) 1,000 units tablet, Take 2,000 Units by mouth daily, Disp: , Rfl:     COLLAGEN PO, Take by mouth, Disp: , Rfl:     IODINE, KELP, PO, Take by mouth, Disp: , Rfl:     Magnesium 300 MG CAPS, Take 1 capsule by mouth daily, Disp: , Rfl:     Multiple Vitamin (multivitamin) tablet, Take 1 tablet by mouth daily, Disp: , Rfl:     Probiotic Product (PROBIOTIC-10) CAPS, Take by mouth, Disp: , Rfl:     Turmeric 500 MG CAPS, Take by mouth, Disp: , Rfl:   Patient Active Problem List   Diagnosis    Fatigue    Dense breast    Elevated LDL cholesterol level    Mitral regurgitation    Elevated cholesterol    Low vitamin D level    Elevated blood pressure, situational    Heart palpitations    Generalized anxiety disorder    Urine frequency    Abnormal platelets (HCC)    Perimenopause    Vitamin D deficiency    Vaccine counseling     Past Medical History:   Diagnosis Date    Depression     Mitral valve disorder      Family History   Problem Relation Age of Onset    Hypertension Mother     Heart disease Family         Cath Mitral Valve    Hypertension Family     Tuberculosis Family     Hypertension Father     No Known Problems Sister     No Known Problems Daughter     No Known Problems Maternal Grandmother     No Known Problems Maternal Grandfather     No Known Problems Paternal Grandmother     No Known Problems Paternal Grandfather     No Known Problems Daughter     No Known Problems Maternal Aunt     No Known Problems Maternal Aunt     No Known Problems Maternal Aunt     Lung cancer Paternal Aunt 54          Review of Systems   Respiratory: Negative  Cardiovascular: Negative  Gastrointestinal: Negative for constipation and diarrhea  O:  Blood pressure 128/82, height 5' 4" (1 626 m), weight 78 7 kg (173 lb 6 4 oz), last menstrual period 09/06/2022  Patient appears well and is not in distress  Breasts are symmetrical without mass, tenderness, nipple discharge, skin changes or adenopathy  Abdomen is soft and nontender without masses  External genitals are normal without lesions or rashes  Urethral meatus and urethra are normal  Bladder is normal to palpation  Vagina is normal without discharge or bleeding  Cervix is normal without discharge or lesion  Uterus is normal, mobile, nontender without palpable mass  Adnexa are normal, nontender, without palpable mass  A:  Yearly exam      P:   Pap & HPV today per patient request   Mammo up to date, discussed recommendations to go annually   Colon Cancer Screening - has FIT ordered, discussed this is done annually   RTO one year for yearly exam or sooner as needed

## 2022-09-29 LAB
HPV HR 12 DNA CVX QL NAA+PROBE: NEGATIVE
HPV16 DNA CVX QL NAA+PROBE: NEGATIVE
HPV18 DNA CVX QL NAA+PROBE: NEGATIVE

## 2022-10-07 LAB
LAB AP GYN PRIMARY INTERPRETATION: NORMAL
Lab: NORMAL

## 2022-10-12 ENCOUNTER — TELEPHONE (OUTPATIENT)
Dept: OBGYN CLINIC | Facility: CLINIC | Age: 52
End: 2022-10-12

## 2022-10-12 NOTE — TELEPHONE ENCOUNTER
----- Message from Robel Moon MD sent at 10/10/2022 10:08 AM EDT -----  Please call Annalise Fajardo to let her know pap and HPV are normal

## 2022-12-19 ENCOUNTER — OFFICE VISIT (OUTPATIENT)
Dept: FAMILY MEDICINE CLINIC | Facility: CLINIC | Age: 52
End: 2022-12-19

## 2022-12-19 VITALS
RESPIRATION RATE: 18 BRPM | SYSTOLIC BLOOD PRESSURE: 132 MMHG | HEIGHT: 64 IN | BODY MASS INDEX: 30.46 KG/M2 | TEMPERATURE: 97.3 F | WEIGHT: 178.4 LBS | OXYGEN SATURATION: 99 % | HEART RATE: 92 BPM | DIASTOLIC BLOOD PRESSURE: 88 MMHG

## 2022-12-19 DIAGNOSIS — E78.00 ELEVATED CHOLESTEROL: ICD-10-CM

## 2022-12-19 DIAGNOSIS — R03.0 ELEVATED BLOOD PRESSURE, SITUATIONAL: ICD-10-CM

## 2022-12-19 DIAGNOSIS — L03.011 PARONYCHIA OF FINGER OF RIGHT HAND: Primary | ICD-10-CM

## 2022-12-19 PROBLEM — L03.019 PARONYCHIA OF FINGER: Status: ACTIVE | Noted: 2022-12-19

## 2022-12-19 NOTE — ASSESSMENT & PLAN NOTE
Discussed soaking in lukewarm water and application of mupirocin  Patient will follow-up if it gets worse or if it is not getting better

## 2022-12-19 NOTE — PROGRESS NOTES
Name: Ariel Grimaldo      : 1970      MRN: [id number]  Encounter Provider: Luis Felipe Anderson MD  Encounter Date: 2022   Encounter department: [address] [place] [address]  Paronychia of finger of right hand  Assessment & Plan:  Discussed soaking in lukewarm water and application of mupirocin  Patient will follow-up if it gets worse or if it is not getting better  Orders:  -     mupirocin (BACTROBAN) 2 % ointment; Apply topically 3 (three) times a day    2  Elevated cholesterol  Assessment & Plan:  Reviewed recent lipid panel, elevated cholesterol  Discussed lifestyle modification in detail  BMI Counseling: Body mass index is 30 62 kg/m²  The BMI is above normal  Nutrition recommendations include reducing portion sizes and decreasing overall calorie intake  Exercise recommendations include moderate aerobic physical activity for 150 minutes/week  3  Elevated blood pressure, situational  Assessment & Plan:  Blood pressure 132/88 today, within goal   Continue lifestyle modification with diet and exercise           Subjective      Sarina Mansfield presented to office for review of labs  Reports she started a new job, and is doing great  She is concerned about swelling in her right second finger, which she noticed few weeks ago  Reports there was discharge from the finger earlier, but now has not noticed it  It is slightly sore  Her job does involve washing and doing dishes and thinks it could be secondary to that  Patient also reports that she does not do any formal exercise, but recently started yoga  She was advised to get fit test done at the last visit, she has not done it yet because she had questions about it  She is planning on doing it  She does not want colonoscopy at this time  Review of Systems   Constitutional: Negative for chills and fever  HENT: Negative for congestion  Respiratory: Negative for shortness of breath  Cardiovascular: Negative for chest pain  Gastrointestinal: Negative for diarrhea, nausea and vomiting  Genitourinary: Negative for difficulty urinating  Skin: Positive for rash  Neurological: Negative for headaches  Current Outpatient Medications on File Prior to Visit   Medication Sig   • cholecalciferol (VITAMIN D3) 1,000 units tablet Take 2,000 Units by mouth daily   • COLLAGEN PO Take by mouth   • IODINE, KELP, PO Take by mouth   • Magnesium 300 MG CAPS Take 1 capsule by mouth daily   • Multiple Vitamin (multivitamin) tablet Take 1 tablet by mouth daily   • Probiotic Product (PROBIOTIC-10) CAPS Take by mouth   • Turmeric 500 MG CAPS Take by mouth       Objective     /88 (BP Location: Left arm, Patient Position: Sitting, Cuff Size: Large)   Pulse 92   Temp (!) 97 3 °F (36 3 °C) (Temporal)   Resp 18   Ht 5' 4" (1 626 m)   Wt 80 9 kg (178 lb 6 4 oz)   LMP  (LMP Unknown)   SpO2 99%   BMI 30 62 kg/m²     Physical Exam  Constitutional:       Appearance: She is well-developed  HENT:      Head: Normocephalic and atraumatic  Right Ear: External ear normal       Left Ear: External ear normal    Eyes:      Conjunctiva/sclera: Conjunctivae normal    Cardiovascular:      Rate and Rhythm: Normal rate and regular rhythm  Heart sounds: Normal heart sounds  No murmur heard  No friction rub  Pulmonary:      Effort: Pulmonary effort is normal  No respiratory distress  Breath sounds: Normal breath sounds  No wheezing or rales  Musculoskeletal:         General: Normal range of motion  Cervical back: Normal range of motion and neck supple  Skin:     General: Skin is warm and dry  Comments: Erythematous and swollen base of nail of finger on right hand, nontender   Neurological:      Mental Status: She is alert and oriented to person, place, and time         Rajni Álvarez MD

## 2022-12-19 NOTE — ASSESSMENT & PLAN NOTE
Reviewed recent lipid panel, elevated cholesterol  Discussed lifestyle modification in detail  BMI Counseling: Body mass index is 30 62 kg/m²  The BMI is above normal  Nutrition recommendations include reducing portion sizes and decreasing overall calorie intake  Exercise recommendations include moderate aerobic physical activity for 150 minutes/week

## 2023-06-22 ENCOUNTER — RA CDI HCC (OUTPATIENT)
Dept: OTHER | Facility: HOSPITAL | Age: 53
End: 2023-06-22

## 2023-06-22 NOTE — PROGRESS NOTES
[facility]  coding opportunities       Chart reviewed, no opportunity found: CHART REVIEWED, NO OPPORTUNITY FOUND        Patients Insurance        Commercial Insurance: [employer]

## 2023-06-29 ENCOUNTER — OFFICE VISIT (OUTPATIENT)
Dept: FAMILY MEDICINE CLINIC | Facility: CLINIC | Age: 53
End: 2023-06-29

## 2023-06-29 VITALS
BODY MASS INDEX: 30.73 KG/M2 | TEMPERATURE: 98.1 F | HEART RATE: 87 BPM | SYSTOLIC BLOOD PRESSURE: 128 MMHG | WEIGHT: 179 LBS | DIASTOLIC BLOOD PRESSURE: 89 MMHG

## 2023-06-29 DIAGNOSIS — Z12.11 SCREENING FOR COLON CANCER: ICD-10-CM

## 2023-06-29 DIAGNOSIS — E78.00 ELEVATED CHOLESTEROL: ICD-10-CM

## 2023-06-29 DIAGNOSIS — F41.1 GENERALIZED ANXIETY DISORDER: Primary | ICD-10-CM

## 2023-06-29 DIAGNOSIS — R21 RASH: ICD-10-CM

## 2023-06-29 DIAGNOSIS — R00.2 HEART PALPITATIONS: ICD-10-CM

## 2023-06-29 PROBLEM — L03.019 PARONYCHIA OF FINGER: Status: RESOLVED | Noted: 2022-12-19 | Resolved: 2023-06-29

## 2023-06-29 NOTE — PROGRESS NOTES
"Name: Kole Yoo      : 1970      MRN: [id number]  Encounter Provider: Armand Freeman MD  Encounter Date: 2023   Encounter department: [address] [place] [address]  Generalized anxiety disorder  Assessment & Plan:  Anxiety had worsened with palpitations since last visit but has greatly improved over the last 2 months since starting to take daily Fresh Cap mushroom complex supplements  Discussed that there are no studies done on over-the-counter supplements and how they affect the body  Patient understands  2  BMI 30 0-30 9,adult  Assessment & Plan:  Patient has been unable to lose weight despite healthy diet and daily yoga exercise  She is interested in aid with weight loss  Put in a referral to Weight management and advised the patient to continue her dietary and exercise regimens  Orders:  -     Ambulatory Referral to Weight Management; Future    3  Heart palpitations  Assessment & Plan:  Patient experiencing palpitations most likely due to anxiety  Pt reports palpitations have resolved since cutting out caffeine and taking daily mushroom complex supplements  Plan to check CMP, TSH, and CBC to rule out other etiologies  Orders:  -     Comprehensive metabolic panel; Future  -     TSH, 3rd generation; Future  -     CBC and differential; Future    4  Elevated cholesterol  Assessment & Plan:  Discussed lifestyle modification with diet and exercise, will recheck lipid panel  Orders:  -     Lipid panel; Future    5  Rash  Assessment & Plan:  Patient presents with \"poison\" rash which she developed after working in the garden outside  She reports application of Myrrh essential oil has helped with the itch  On physical exam there is a patchy, erythematous maculopapular rash on her left arm, right wrist, and upper chest consistent with contact dermatitis   Prescribed hydrocortisone 2 5% cream and advised the patient to apply as needed until " the rash resolves  Orders:  -     hydrocortisone 2 5 % cream; Apply topically 2 (two) times a day    6  Screening for colon cancer  -     Occult Blood, Fecal Immunochemical; Future  -     Ambulatory Referral to Gastroenterology; Future         Subjective      Patient presents for general follow-up  She recently developed contact dermatitis after gardening in the yard on both arms  She reports using Myrrh essential oil which has helped with the itch  She is worried her left eye is also affected as it is itchy and irritated  On physical exam, the left eye is not erythematous or swollen  She reports her anxiety had been worse in the last 6 months and associated with palpitations  However, both the anxiety and palpitations have improved over the last 2 months with Fresh Cap mushroom complex supplements which she has been taking daily  She has been attempting weight loss with a well balanced diet and daily yoga exercise, but her weight is unchanged and she is interested in weight loss aids  Review of Systems   Constitutional: Negative for fatigue, fever and unexpected weight change  Respiratory: Negative for chest tightness and shortness of breath  Cardiovascular: Negative for chest pain  Gastrointestinal: Negative for abdominal pain, constipation and diarrhea  Genitourinary: Negative for difficulty urinating and dysuria  Neurological: Negative for dizziness, weakness and light-headedness         Current Outpatient Medications on File Prior to Visit   Medication Sig   • cholecalciferol (VITAMIN D3) 1,000 units tablet Take 2,000 Units by mouth daily   • COLLAGEN PO Take by mouth   • IODINE, KELP, PO Take by mouth   • Magnesium 300 MG CAPS Take 1 capsule by mouth daily   • Multiple Vitamin (multivitamin) tablet Take 1 tablet by mouth daily   • Probiotic Product (PROBIOTIC-10) CAPS Take by mouth   • Turmeric 500 MG CAPS Take by mouth   • mupirocin (BACTROBAN) 2 % ointment Apply topically 3 (three) times a day (Patient not taking: Reported on 6/29/2023)       Objective     /89 (BP Location: Left arm, Patient Position: Sitting, Cuff Size: Large)   Pulse 87   Temp 98 1 °F (36 7 °C) (Temporal)   Wt 81 2 kg (179 lb)   BMI 30 73 kg/m²     Physical Exam  Vitals reviewed  Constitutional:       General: She is not in acute distress  Appearance: Normal appearance  She is not ill-appearing  HENT:      Head: Normocephalic and atraumatic  Right Ear: Tympanic membrane, ear canal and external ear normal       Left Ear: Tympanic membrane, ear canal and external ear normal       Mouth/Throat:      Pharynx: Oropharynx is clear  Eyes:      Extraocular Movements: Extraocular movements intact  Conjunctiva/sclera: Conjunctivae normal       Pupils: Pupils are equal, round, and reactive to light  Cardiovascular:      Rate and Rhythm: Normal rate and regular rhythm  Pulses: Normal pulses  Heart sounds: Normal heart sounds  Pulmonary:      Effort: Pulmonary effort is normal  No respiratory distress  Breath sounds: Normal breath sounds  Musculoskeletal:         General: No swelling  Cervical back: Neck supple  No tenderness  Lymphadenopathy:      Cervical: No cervical adenopathy  Skin:     General: Skin is warm and dry  Findings: Rash present  Comments: Erythematous, pruritic maculopapular patches on the left arm and right wrist  Consistent with contact dermatitis  Neurological:      General: No focal deficit present  Mental Status: She is alert and oriented to person, place, and time     Psychiatric:         Mood and Affect: Mood normal          Behavior: Behavior normal        Gwyn Sandifer, MD

## 2023-06-29 NOTE — ASSESSMENT & PLAN NOTE
Patient has been unable to lose weight despite healthy diet and daily yoga exercise  She is interested in aid with weight loss  Put in a referral to Weight management and advised the patient to continue her dietary and exercise regimens

## 2023-06-29 NOTE — ASSESSMENT & PLAN NOTE
Patient experiencing palpitations most likely due to anxiety  Pt reports palpitations have resolved since cutting out caffeine and taking daily mushroom complex supplements  Plan to check CMP, TSH, and CBC to rule out other etiologies

## 2023-06-29 NOTE — ASSESSMENT & PLAN NOTE
"Patient presents with \"poison\" rash which she developed after working in the garden outside  She reports application of Myrrh essential oil has helped with the itch  On physical exam there is a patchy, erythematous maculopapular rash on her left arm, right wrist, and upper chest consistent with contact dermatitis  Prescribed hydrocortisone 2 5% cream and advised the patient to apply as needed until the rash resolves     "

## 2023-06-29 NOTE — ASSESSMENT & PLAN NOTE
Anxiety had worsened with palpitations since last visit but has greatly improved over the last 2 months since starting to take daily Fresh Cap mushroom complex supplements  Discussed that there are no studies done on over-the-counter supplements and how they affect the body  Patient understands

## 2023-10-04 ENCOUNTER — ANNUAL EXAM (OUTPATIENT)
Age: 53
End: 2023-10-04
Payer: COMMERCIAL

## 2023-10-04 VITALS
DIASTOLIC BLOOD PRESSURE: 82 MMHG | SYSTOLIC BLOOD PRESSURE: 120 MMHG | WEIGHT: 183.2 LBS | BODY MASS INDEX: 32.46 KG/M2 | HEIGHT: 63 IN

## 2023-10-04 DIAGNOSIS — Z01.419 ROUTINE GYNECOLOGICAL EXAMINATION: Primary | ICD-10-CM

## 2023-10-04 DIAGNOSIS — Z12.4 SCREENING FOR CERVICAL CANCER: ICD-10-CM

## 2023-10-04 PROCEDURE — 99396 PREV VISIT EST AGE 40-64: CPT | Performed by: OBSTETRICS & GYNECOLOGY

## 2023-10-04 PROCEDURE — G0476 HPV COMBO ASSAY CA SCREEN: HCPCS | Performed by: OBSTETRICS & GYNECOLOGY

## 2023-10-04 PROCEDURE — G0145 SCR C/V CYTO,THINLAYER,RESCR: HCPCS | Performed by: OBSTETRICS & GYNECOLOGY

## 2023-10-04 NOTE — PROGRESS NOTES
[facility]  1970      CC:  Yearly exam    S:  50 y.o. female here for yearly exam. Her cycles are usually fairly regular, not heavy or crampy. Did have two cycles in April, but normalized again since. Did notice in June she had some intense breast pain, they felt swollen - this has improved. She denies vaginal discharge, itching, pelvic pain. She does not have incontinence. Does notice an increase in urinary frequency as well as up at night to void. No bowel concerns. No breast concerns. Sexual activity: She is sexually active without pain, bleeding or dryness. She is  and monogamous. She is not interested in STD screening today. Contraception: She uses vasectomy for contraception. Last Pap: 9/28/22 - NILM, Neg HPV  Last Mammo: 12/15/241 - [facility] 1, scheduled 2023  No colon cancer screening yet    We reviewed ASCCP guidelines for Pap testing today.      Family hx of breast cancer: no  Family hx of ovarian cancer: no  Family hx of colon cancer: no      Current Outpatient Medications:   •  cholecalciferol (VITAMIN D3) 1,000 units tablet, Take 2,000 Units by mouth daily, Disp: , Rfl:   •  COLLAGEN PO, Take by mouth, Disp: , Rfl:   •  hydrocortisone 2.5 % cream, Apply topically 2 (two) times a day, Disp: 30 g, Rfl: 0  •  IODINE, KELP, PO, Take by mouth, Disp: , Rfl:   •  Magnesium 300 MG CAPS, Take 1 capsule by mouth daily, Disp: , Rfl:   •  Multiple Vitamin (multivitamin) tablet, Take 1 tablet by mouth daily, Disp: , Rfl:   •  Probiotic Product (PROBIOTIC-10) CAPS, Take by mouth, Disp: , Rfl:   •  Turmeric 500 MG CAPS, Take by mouth, Disp: , Rfl:   •  mupirocin (BACTROBAN) 2 % ointment, Apply topically 3 (three) times a day, Disp: 22 g, Rfl: 0  Patient Active Problem List   Diagnosis   • Fatigue   • Dense breast   • Mitral regurgitation   • Elevated cholesterol   • Elevated blood pressure, situational   • Heart palpitations   • Generalized anxiety disorder   • Abnormal platelets (HCC)   • Perimenopause   • Vitamin D deficiency   • Vaccine counseling   • BMI 30.0-30.9,adult   • Rash     Past Medical History:   Diagnosis Date   • Anxiety    • Depression    • Mitral valve disorder      Family History   Problem Relation Age of Onset   • Hypertension Mother    • Hypertension Father    • No Known Problems Sister    • No Known Problems Brother    • No Known Problems Daughter    • No Known Problems Daughter    • No Known Problems Maternal Grandmother    • No Known Problems Maternal Grandfather    • No Known Problems Paternal Grandmother    • No Known Problems Paternal Grandfather    • No Known Problems Maternal Aunt    • No Known Problems Maternal Aunt    • No Known Problems Maternal Aunt    • Lung cancer Paternal Aunt 54   • Heart disease Family         Cath Mitral Valve   • Hypertension Family    • Tuberculosis Family       Review of Systems   Respiratory: Negative. Cardiovascular: Negative. Gastrointestinal: Negative for constipation and diarrhea. O:  Blood pressure 120/82, height 5' 3" (1.6 m), weight 83.1 kg (183 lb 3.2 oz), last menstrual period 09/16/2023. Patient appears well and is not in distress  Breasts are symmetrical without mass, tenderness, nipple discharge, skin changes or adenopathy. Abdomen is soft and nontender without masses. External genitals are normal without lesions or rashes. Urethral meatus and urethra are normal  Bladder is normal to palpation  Vagina is normal without discharge or bleeding. Cervix is normal without discharge or lesion. Uterus is normal, mobile, nontender without palpable mass. Adnexa are normal, nontender, without palpable mass. A:  Yearly exam.     P:   Pap & HPV today per pt request   Mammo is scheduled   RTO one year for yearly exam or sooner as needed.

## 2023-10-13 LAB
LAB AP GYN PRIMARY INTERPRETATION: NORMAL
LAB AP LMP: NORMAL
Lab: NORMAL

## 2023-10-20 ENCOUNTER — HOSPITAL ENCOUNTER (OUTPATIENT)
Dept: MAMMOGRAPHY | Facility: CLINIC | Age: 53
Discharge: HOME | End: 2023-10-20
Payer: COMMERCIAL

## 2023-10-20 VITALS — WEIGHT: 180 LBS | HEIGHT: 63 IN | BODY MASS INDEX: 31.89 KG/M2

## 2023-10-20 DIAGNOSIS — Z12.31 ENCOUNTER FOR SCREENING MAMMOGRAM FOR MALIGNANT NEOPLASM OF BREAST: ICD-10-CM

## 2023-10-20 PROCEDURE — 77067 SCR MAMMO BI INCL CAD: CPT

## 2023-10-20 PROCEDURE — 77063 BREAST TOMOSYNTHESIS BI: CPT

## 2024-02-29 ENCOUNTER — OFFICE VISIT (OUTPATIENT)
Dept: FAMILY MEDICINE CLINIC | Facility: CLINIC | Age: 54
End: 2024-02-29

## 2024-02-29 VITALS
BODY MASS INDEX: 32.82 KG/M2 | SYSTOLIC BLOOD PRESSURE: 129 MMHG | TEMPERATURE: 98.4 F | RESPIRATION RATE: 18 BRPM | HEART RATE: 94 BPM | OXYGEN SATURATION: 99 % | WEIGHT: 185.2 LBS | DIASTOLIC BLOOD PRESSURE: 87 MMHG | HEIGHT: 63 IN

## 2024-02-29 DIAGNOSIS — R00.2 HEART PALPITATIONS: ICD-10-CM
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
## 2024-02-29 DIAGNOSIS — D69.1 ABNORMAL PLATELETS (HCC): ICD-10-CM

## 2024-02-29 DIAGNOSIS — E78.00 ELEVATED CHOLESTEROL: ICD-10-CM

## 2024-02-29 DIAGNOSIS — Z11.59 NEED FOR HEPATITIS C SCREENING TEST: ICD-10-CM

## 2024-02-29 DIAGNOSIS — F41.1 GENERALIZED ANXIETY DISORDER: ICD-10-CM

## 2024-02-29 DIAGNOSIS — R53.83 OTHER FATIGUE: ICD-10-CM

## 2024-02-29 DIAGNOSIS — R03.0 ELEVATED BLOOD PRESSURE, SITUATIONAL: ICD-10-CM

## 2024-02-29 DIAGNOSIS — N95.1 PERIMENOPAUSE: Primary | ICD-10-CM
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
## 2024-02-29 PROCEDURE — 99214 OFFICE O/P EST MOD 30 MIN: CPT | Performed by: FAMILY MEDICINE

## 2024-02-29 NOTE — ASSESSMENT & PLAN NOTE
Patient found tick on herself last summer and removed it, <24 .  Patient noted red area after tick removal.  Patient has increased fatigue so test for lyme disease

## 2024-02-29 NOTE — ASSESSMENT & PLAN NOTE
Patient has increased fatigue.  Normal TSH, Normal Hemoglobin.  Patient has a history of vitamin D deficiency and is no longer taking vitamin D.  Start Vitamin D3 1,000 every day especially during the winter months.

## 2024-02-29 NOTE — ASSESSMENT & PLAN NOTE
Vitamin D level 41 9/2021.  No longer taking vitamin D and patient reports fatigue.  Repeat vitamin D level.

## 2024-02-29 NOTE — ASSESSMENT & PLAN NOTE
Patient has menstrual periods twice per month every month with increased bleeding and breast tenderness. Advised patient to monitor menstrual frequency

## 2024-02-29 NOTE — PROGRESS NOTES
Name: Tatiana Nichols      : 1970      MRN: [id number]  Encounter Provider: Mary Timmons MD  Encounter Date: 2024   Encounter department: [facility]    Assessment & Plan     1. Perimenopause  Assessment & Plan:  Patient has menstrual periods twice per month every month with increased bleeding and breast tenderness. Advised patient to monitor menstrual frequency       2. Generalized anxiety disorder  Assessment & Plan:  Stable without medication.       3. Heart palpitations  Assessment & Plan:  Stable, no recent episodes of heart palpitations      4. Elevated blood pressure, situational  Assessment & Plan:  Stable, blood pressure today 129/87      5. Abnormal platelets (HCC)  Assessment & Plan:   Platelets 359.   Monitor with yearly CBC      6. Elevated cholesterol  Assessment & Plan:  Total cholesterol 227 TG 79 HDL 71 .  Continue walking and dietary modifications      7. Need for hepatitis C screening test  -     Hepatitis C antibody; Future    8. Other fatigue  Assessment & Plan:  Patient has increased fatigue.  Normal TSH, Normal Hemoglobin.  Patient has a history of vitamin D deficiency and is no longer taking vitamin D.  Start Vitamin D3 1,000 every day especially during the winter months.    Orders:  -     Vitamin D 25 hydroxy; Future    9. Tick bite, unspecified site, sequela  Assessment & Plan:  Patient found tick on herself last summer and removed it, <24 .  Patient noted red area after tick removal.  Patient has increased fatigue so test for lyme disease     Orders:  -     Lyme Total AB W Reflex to IGM/IGG; Future      BMI Counseling: Body mass index is 32.81 kg/m². The BMI is above normal. Nutrition recommendations include decreasing portion sizes, encouraging healthy choices of fruits and vegetables, decreasing fast food intake and consuming healthier snacks. Exercise recommendations include moderate physical activity 150 minutes/week. No  pharmacotherapy was ordered. Patient referred to PCP. Rationale for BMI follow-up plan is due to patient being overweight or obese.     Depression Screening and Follow-up Plan: Patient was screened for depression during today's encounter. They screened negative with a PHQ-2 score of 0.        Tori Simpson is a 53 year old female with CHE presenting with fatigue.  Patient overall feels well. Patient's menstrual cycle occurs every month, usually twice a month with increased bleeding and breast tenderness.  She has not missed any months with her menstrual period.  Patient had a normal vitamin D and stopped taking vitamin D, but has not had her level checked since 2021.  She feels like her anxiety and heart palpitations have been stable.  She has been walking every day for 30 minutes- 2 hours and has been eating a more balanced diet, but it struggling to lose weight.  She did not go to the weight management referral.        Review of Systems   Constitutional:  Negative for chills and fever.   Eyes:  Negative for pain and visual disturbance.   Respiratory:  Negative for shortness of breath.    Cardiovascular:  Negative for chest pain and palpitations.   Genitourinary:  Positive for menstrual problem.   Neurological:  Negative for light-headedness and headaches.   All other systems reviewed and are negative.      Current Outpatient Medications on File Prior to Visit   Medication Sig    cholecalciferol (VITAMIN D3) 1,000 units tablet Take 2,000 Units by mouth daily    COLLAGEN PO Take by mouth    IODINE, KELP, PO Take by mouth    Magnesium 300 MG CAPS Take 1 capsule by mouth daily    Multiple Vitamin (multivitamin) tablet Take 1 tablet by mouth daily    Probiotic Product (PROBIOTIC-10) CAPS Take by mouth    Turmeric 500 MG CAPS Take by mouth    hydrocortisone 2.5 % cream Apply topically 2 (two) times a day    mupirocin (BACTROBAN) 2 % ointment Apply topically 3 (three) times a day       Objective     /87 (BP  "Location: Left arm, Patient Position: Sitting, Cuff Size: Large)   Pulse 94   Temp 98.4 °F (36.9 °C) (Temporal)   Resp 18   Ht 5' 3\" (1.6 m)   Wt 84 kg (185 lb 3.2 oz)   SpO2 99%   BMI 32.81 kg/m²     Physical Exam  Constitutional:       Appearance: Normal appearance.   HENT:      Right Ear: External ear normal.      Left Ear: External ear normal.   Eyes:      Conjunctiva/sclera: Conjunctivae normal.   Neck:      Thyroid: No thyroid mass or thyroid tenderness.   Cardiovascular:      Rate and Rhythm: Normal rate and regular rhythm.      Pulses: Normal pulses.      Heart sounds: No murmur heard.  Pulmonary:      Breath sounds: Normal breath sounds. No wheezing.   Skin:     General: Skin is warm and dry.   Neurological:      Mental Status: She is alert.       Renee Gordon, MS3  Mary Timmons MD    "

## 2024-08-29 ENCOUNTER — TELEPHONE (OUTPATIENT)
Dept: FAMILY MEDICINE CLINIC | Facility: CLINIC | Age: 54
End: 2024-08-29

## 2024-08-29 DIAGNOSIS — Z12.11 SCREENING FOR COLON CANCER: Primary | ICD-10-CM

## 2024-08-29 NOTE — TELEPHONE ENCOUNTER
Patient's colonoscopy script  in   , Patient will need a new script    Patient can be reached at [phone]

## 2024-10-18 ENCOUNTER — PREP FOR PROCEDURE (OUTPATIENT)
Age: 54
End: 2024-10-18

## 2024-10-18 ENCOUNTER — TELEPHONE (OUTPATIENT)
Age: 54
End: 2024-10-18

## 2024-10-18 DIAGNOSIS — Z12.11 SCREENING FOR COLON CANCER: Primary | ICD-10-CM

## 2024-10-18 NOTE — TELEPHONE ENCOUNTER
10/18/24  Screened by: Aby Villarreal     Referring Provider Dr. Mary Timmons      Pre- Screening:      Body mass index is 32.81 kg/m².  Has patient been referred for a routine screening Colonoscopy? yes  Is the patient between 45-75 years old? yes        Previous Colonoscopy no   If yes:    Date:     Facility:     Reason:            Does the patient want to see a Gastroenterologist prior to their procedure OR are they having any GI symptoms? no     Has the patient been hospitalized or had abdominal surgery in the past 6 months? no     Does the patient use supplemental oxygen? no     Does the patient take Coumadin, Lovenox, Plavix, Elliquis, Xarelto, or other blood thinning medication? no     Has the patient had a stroke, cardiac event, or stent placed in the past year? no          If patient is between 45yrs - 49yrs, please advise patient that we will have to confirm benefits & coverage with their insurance company for a routine screening colonoscopy.     Previous

## 2024-10-18 NOTE — TELEPHONE ENCOUNTER
Scheduled date of colonoscopy (as of today):12/9/24  Physician performing colonoscopy:Dr. Ríos  Location of colonoscopy:[facility]  Bowel prep reviewed with patient:Emailed gus/dul prep instructions to patient's email   Instructions reviewed with patient by:sherry  Clearances: n/a    Pt has additional questions about going under anesthesia stating she has difficulty when that is done, please contact her back to discuss.

## 2024-10-21 NOTE — TELEPHONE ENCOUNTER
Returned call, no answer, left message requesting patient call back regarding concerns about anesthesia. Patient currently scheduled for colonoscopy 12/9/24 Dr. Ríos.

## 2024-10-23 NOTE — TELEPHONE ENCOUNTER
Called patient again, no answer, left another message requesting patient return call in regards to anesthesia and procedure 12/9/24.

## 2025-01-02 ENCOUNTER — ANNUAL EXAM (OUTPATIENT)
Age: 55
End: 2025-01-02
Payer: COMMERCIAL

## 2025-01-02 VITALS
DIASTOLIC BLOOD PRESSURE: 84 MMHG | WEIGHT: 180.4 LBS | HEIGHT: 63 IN | SYSTOLIC BLOOD PRESSURE: 124 MMHG | BODY MASS INDEX: 31.96 KG/M2

## 2025-01-02 DIAGNOSIS — Z11.51 SCREENING FOR HUMAN PAPILLOMAVIRUS (HPV): ICD-10-CM

## 2025-01-02 DIAGNOSIS — Z12.31 SCREENING MAMMOGRAM FOR BREAST CANCER: ICD-10-CM

## 2025-01-02 DIAGNOSIS — Z01.419 ENCOUNTER FOR ANNUAL ROUTINE GYNECOLOGICAL EXAMINATION: Primary | ICD-10-CM

## 2025-01-02 DIAGNOSIS — Z12.4 SCREENING FOR CERVICAL CANCER: ICD-10-CM

## 2025-01-02 PROCEDURE — 99396 PREV VISIT EST AGE 40-64: CPT | Performed by: OBSTETRICS & GYNECOLOGY

## 2025-01-02 PROCEDURE — G0145 SCR C/V CYTO,THINLAYER,RESCR: HCPCS | Performed by: OBSTETRICS & GYNECOLOGY

## 2025-01-02 PROCEDURE — G0476 HPV COMBO ASSAY CA SCREEN: HCPCS | Performed by: OBSTETRICS & GYNECOLOGY

## 2025-01-02 RX ORDER — KETOCONAZOLE 20 MG/ML
SHAMPOO, SUSPENSION TOPICAL
COMMUNITY
Start: 2024-12-03

## 2025-01-02 NOTE — PROGRESS NOTES
Tatiana Nichols  1970      CC:  Yearly exam    S:  54 y.o. female here for yearly exam.  LMP in 11/2024.  Just starting to skip cycles.   Occasional hot flash/night sweats.      She denies vaginal discharge, itching, pelvic pain.   She has no urinary concerns, does not have incontinence.  No bowel concerns.  No breast concerns.     Sexual activity: She is sexually active without pain, bleeding or dryness.   She is  and monogamous.   She is not interested in STD screening today.     Contraception: She uses vasectomy for contraception.     Last Pap: 10/4/2023 - NILM, Neg HPV  Last Mammo: 10/20/2023 - BIRad 1  Colonoscopy is scheduled 1/28/25    We reviewed ASCCP guidelines for Pap testing today.     Family hx of breast cancer: no  Family hx of ovarian cancer: no  Family hx of colon cancer: no      Current Outpatient Medications:     cholecalciferol (VITAMIN D3) 1,000 units tablet, Take 2,000 Units by mouth daily, Disp: , Rfl:     COLLAGEN PO, Take by mouth, Disp: , Rfl:     IODINE, KELP, PO, Take by mouth, Disp: , Rfl:     ketoconazole (NIZORAL) 2 % shampoo, APPLY TO SKIN ONCE DAILY IN THE SHOWER. KEEP ON FOR 5 MINUTES THEN RINSE OFF., Disp: , Rfl:     Magnesium 300 MG CAPS, Take 1 capsule by mouth daily, Disp: , Rfl:     Multiple Vitamin (multivitamin) tablet, Take 1 tablet by mouth daily, Disp: , Rfl:     Probiotic Product (PROBIOTIC-10) CAPS, Take by mouth, Disp: , Rfl:     Turmeric 500 MG CAPS, Take by mouth, Disp: , Rfl:     hydrocortisone 2.5 % cream, Apply topically 2 (two) times a day, Disp: 30 g, Rfl: 0    mupirocin (BACTROBAN) 2 % ointment, Apply topically 3 (three) times a day, Disp: 22 g, Rfl: 0    Patient Active Problem List   Diagnosis    Fatigue    Dense breast    Mitral regurgitation    Elevated cholesterol    Elevated blood pressure, situational    Heart palpitations    Generalized anxiety disorder    Abnormal platelets (HCC)    Perimenopause    Vitamin D deficiency    Vaccine counseling  "   BMI 30.0-30.9,adult    Rash    Tick bite     Past Medical History:   Diagnosis Date    Anxiety     Depression     Mitral valve disorder      Family History   Problem Relation Age of Onset    Hypertension Mother     Hypertension Father     No Known Problems Sister     No Known Problems Brother     No Known Problems Daughter     No Known Problems Daughter     No Known Problems Maternal Grandmother     No Known Problems Maternal Grandfather     No Known Problems Paternal Grandmother     No Known Problems Paternal Grandfather     No Known Problems Maternal Aunt     No Known Problems Maternal Aunt     No Known Problems Maternal Aunt     Lung cancer Paternal Aunt 55    Heart disease Family         Cath Mitral Valve    Hypertension Family     Tuberculosis Family       Review of Systems   Respiratory: Negative.    Cardiovascular: Negative.    Gastrointestinal: Negative for constipation and diarrhea.     O:  Blood pressure 124/84, height 5' 3\" (1.6 m), weight 81.8 kg (180 lb 6.4 oz), last menstrual period 11/17/2024.    Patient appears well and is not in distress  Breasts are symmetrical without mass, tenderness, nipple discharge, skin changes or adenopathy.   Abdomen is soft and nontender without masses.   External genitals are normal without lesions or rashes.  Urethral meatus and urethra are normal  Bladder is normal to palpation  Vagina is normal without discharge or bleeding.   Cervix is normal without discharge or lesion.   Uterus is normal, mobile, nontender without palpable mass.  Adnexa are normal, nontender, without palpable mass.     A:  Yearly exam.     P:   Pap & HPV today per patient request.    Mammo ordered   Colon Cancer Screening is scheduled!    Reviewed normal perimenopause expectations.    RTO one year for yearly exam or sooner as needed.      "

## 2025-01-08 LAB
LAB AP GYN PRIMARY INTERPRETATION: NORMAL
Lab: NORMAL

## 2025-01-09 ENCOUNTER — RESULTS FOLLOW-UP (OUTPATIENT)
Age: 55
End: 2025-01-09

## 2025-01-13 ENCOUNTER — TELEPHONE (OUTPATIENT)
Age: 55
End: 2025-01-13

## 2025-01-21 NOTE — TELEPHONE ENCOUNTER
"  SUBJECTIVE:   Chyna Moncada is a 68 year old female who presents to clinic today for the following health issues:    Musculoskeletal problem/pain      Duration: Ongoing    Description  Location: neck/shoulders    Intensity:  moderate    Accompanying signs and symptoms: Constant pain.  Can hardly move in the mornings    History  Previous similar problem: YES  Previous evaluation:  x-ray and MRI    Precipitating or alleviating factors:  Trauma or overuse: YES  Aggravating factors include: lifting and overuse    Therapies tried and outcome: acetaminophen, Ibuprofen and PT, Massage, acupuncture, chiropractor, TENS until    Neck extension causing \"firing\" pain.  Pivoting head hurts - avoids pivoting.    Arms sore first thing in morning but \"warm up\" as day goes on.    Rare headache.    No thigh aches.    See 10/16/17 note for further history.    * Left foot-  Has something on the bottom side of her foot.  Hurts to walk on.  Is hard.    Problem list and histories reviewed & adjusted, as indicated.  Additional history: as documented    Reviewed and updated as needed this visit by clinical staffTobacco  Allergies  Meds  Problems  Med Hx  Surg Hx  Fam Hx  Soc Hx        Reviewed and updated as needed this visit by Provider  Tobacco  Allergies  Meds  Problems  Med Hx  Surg Hx  Fam Hx  Soc Hx          ROS:  Constitutional, musculoskeletal, neuro systems are negative, except as otherwise noted.    OBJECTIVE:   /67 (BP Location: Right arm, Patient Position: Chair, Cuff Size: Adult Regular)  Pulse 82  Temp 98.1  F (36.7  C) (Tympanic)  Resp 12  Ht 5' 6\" (1.676 m)  Wt 163 lb (73.9 kg)  BMI 26.31 kg/m2  Body mass index is 26.31 kg/(m^2).  GENERAL: healthy, alert and no distress  NECK: muscles diffusely tight  L FOOT: small callous near area of pain, debulked today, tenderness near distal 5th metatarsal but without pain on compression    Foot xray read by Edilia Davison PA-C as increased " Pt was notified of the providers recommendations as pt called in for results, no further questions.    Show Applicator Variable?: Yes sclerosis near 5th distal metatarsal, no definite sesamoid, no fractures, prev surgical clip near navicular    ASSESSMENT/PLAN:       ICD-10-CM    1. Myofascial pain M79.1 PHYSIATRY REFERRAL   2. Muscle spasm M62.838 PHYSIATRY REFERRAL   3. Cervicalgia M54.2 PHYSIATRY REFERRAL   4. Left foot pain M79.672 PODIATRY/FOOT & ANKLE SURGERY REFERRAL   5. Personal history of fall Z91.81    Patient with history of occasional fall over past 1-2 yrs, felt poor candidate for many muscle relaxer  Has already seen sports med.  Patient Instructions   Neck/shoulder pain -  Seems a lot of spasm  Discussed can try meds like cymbalta but doesn't address muscle spasms as likely cause   Other more muscle relaxant type meds risk more falls - not what we want   Therefore suggest seeing specialist at the .  Possible consideration for botox.    Foot -  Bone abnormality, unsure if cause of symptoms   Do not think callous was cause - too small when I trimmed it  Can try metatarsal pads and see foot doctor if not improving      Edilia Davison PA-C  [facility]     Consent: The patient's consent was obtained including but not limited to risks of crusting, scabbing, blistering, scarring, darker or lighter pigmentary change, recurrence, incomplete removal and infection. Render Post-Care Instructions In Note?: no Detail Level: Detailed Duration Of Freeze Thaw-Cycle (Seconds): 0 Post-Care Instructions: I reviewed with the patient in detail post-care instructions. Patient is to wear sunprotection, and avoid picking at any of the treated lesions. Pt may apply Vaseline to crusted or scabbing areas. Spray Paint Text: The liquid nitrogen was applied to the skin utilizing a spray paint frosting technique. Medical Necessity Information: It is in your best interest to select a reason for this procedure from the list below. All of these items fulfill various CMS LCD requirements except the new and changing color options. Number Of Freeze-Thaw Cycles: 3 freeze-thaw cycles Medical Necessity Clause: This procedure was medically necessary because the lesions that were treated were:

## 2025-01-28 ENCOUNTER — ANESTHESIA (OUTPATIENT)
Dept: GASTROENTEROLOGY | Facility: HOSPITAL | Age: 55
End: 2025-01-28
Payer: COMMERCIAL

## 2025-01-28 ENCOUNTER — ANESTHESIA EVENT (OUTPATIENT)
Dept: GASTROENTEROLOGY | Facility: HOSPITAL | Age: 55
End: 2025-01-28
Payer: COMMERCIAL

## 2025-01-28 ENCOUNTER — HOSPITAL ENCOUNTER (OUTPATIENT)
Dept: GASTROENTEROLOGY | Facility: HOSPITAL | Age: 55
Setting detail: OUTPATIENT SURGERY
Discharge: HOME | End: 2025-01-28
Attending: INTERNAL MEDICINE
Payer: COMMERCIAL

## 2025-01-28 VITALS
TEMPERATURE: 96.5 F | OXYGEN SATURATION: 99 % | RESPIRATION RATE: 16 BRPM | WEIGHT: 180 LBS | HEART RATE: 83 BPM | BODY MASS INDEX: 31.89 KG/M2 | DIASTOLIC BLOOD PRESSURE: 72 MMHG | HEIGHT: 63 IN | SYSTOLIC BLOOD PRESSURE: 122 MMHG

## 2025-01-28 DIAGNOSIS — Z12.11 SCREENING FOR COLON CANCER: ICD-10-CM

## 2025-01-28 PROCEDURE — G0121 COLON CA SCRN NOT HI RSK IND: HCPCS | Performed by: INTERNAL MEDICINE

## 2025-01-28 RX ORDER — PROPOFOL 10 MG/ML
INJECTION, EMULSION INTRAVENOUS CONTINUOUS PRN
Status: DISCONTINUED | OUTPATIENT
Start: 2025-01-28 | End: 2025-01-28

## 2025-01-28 RX ORDER — PROPOFOL 10 MG/ML
INJECTION, EMULSION INTRAVENOUS AS NEEDED
Status: DISCONTINUED | OUTPATIENT
Start: 2025-01-28 | End: 2025-01-28

## 2025-01-28 RX ORDER — LIDOCAINE HYDROCHLORIDE 10 MG/ML
INJECTION, SOLUTION EPIDURAL; INFILTRATION; INTRACAUDAL; PERINEURAL AS NEEDED
Status: DISCONTINUED | OUTPATIENT
Start: 2025-01-28 | End: 2025-01-28

## 2025-01-28 RX ADMIN — PROPOFOL 100 MG: 10 INJECTION, EMULSION INTRAVENOUS at 10:55

## 2025-01-28 RX ADMIN — PROPOFOL 100 MCG/KG/MIN: 10 INJECTION, EMULSION INTRAVENOUS at 10:55

## 2025-01-28 RX ADMIN — PROPOFOL 50 MG: 10 INJECTION, EMULSION INTRAVENOUS at 10:56

## 2025-01-28 RX ADMIN — PROPOFOL 25 MG: 10 INJECTION, EMULSION INTRAVENOUS at 11:08

## 2025-01-28 RX ADMIN — LIDOCAINE HYDROCHLORIDE 50 MG: 10 INJECTION, SOLUTION EPIDURAL; INFILTRATION; INTRACAUDAL; PERINEURAL at 10:54

## 2025-01-28 NOTE — H&P
History and Physical - [facility]  Tatiana Nichols 54 y.o. female MRN: [id number]                  HPI: Tatiana Nichols is a 54 y.o. year old female who presents for colon for screening.      REVIEW OF SYSTEMS: Per the HPI, and otherwise unremarkable.    Historical Information   Past Medical History:   Diagnosis Date    Anxiety     Depression     Mitral valve disorder      Past Surgical History:   Procedure Laterality Date     SECTION      HERNIA REPAIR      INDUCED       By dilation and evacuation    TONSILLECTOMY       Social History   Social History     Substance and Sexual Activity   Alcohol Use Not Currently    Comment: social drinker     Social History     Substance and Sexual Activity   Drug Use Never     Social History     Tobacco Use   Smoking Status Never   Smokeless Tobacco Never     Family History   Problem Relation Age of Onset    Hypertension Mother     Hypertension Father     No Known Problems Sister     No Known Problems Brother     No Known Problems Daughter     No Known Problems Daughter     No Known Problems Maternal Grandmother     No Known Problems Maternal Grandfather     No Known Problems Paternal Grandmother     No Known Problems Paternal Grandfather     No Known Problems Maternal Aunt     No Known Problems Maternal Aunt     No Known Problems Maternal Aunt     Lung cancer Paternal Aunt 55    Heart disease Family         Cath Mitral Valve    Hypertension Family     Tuberculosis Family        Meds/Allergies       Current Outpatient Medications:     cholecalciferol (VITAMIN D3) 1,000 units tablet    COLLAGEN PO    IODINE, KELP, PO    Magnesium 300 MG CAPS    Multiple Vitamin (multivitamin) tablet    Probiotic Product (PROBIOTIC-10) CAPS    Turmeric 500 MG CAPS    ketoconazole (NIZORAL) 2 % shampoo    Allergies   Allergen Reactions    Sulfa Antibiotics Shortness Of Breath    Hydrocodone-Acetaminophen        Objective     /96   Pulse (!) 118   Temp (!)  "97.2 °F (36.2 °C) (Tympanic)   Resp 16   Ht 5' 3\" (1.6 m)   Wt 81.6 kg (180 lb)   LMP 11/17/2024 (Exact Date)   SpO2 100%   BMI 31.89 kg/m²       PHYSICAL EXAM    Gen: NAD  Head: NCAT  CV: RRR  CHEST: Clear  ABD: soft, NT/ND  EXT: no edema      ASSESSMENT/PLAN:  This is a 54 y.o. year old female here for colonoscopy, and she is stable and optimized for her procedure.        "

## 2025-01-28 NOTE — ANESTHESIA POSTPROCEDURE EVALUATION
Post-Op Assessment Note    CV Status:  Stable  Pain Score: 0    Pain management: adequate       Mental Status:  Alert and awake   Hydration Status:  Euvolemic   PONV Controlled:  Controlled   Airway Patency:  Patent     Post Op Vitals Reviewed: Yes    No anethesia notable event occurred.    Staff: CRNA           Last Filed PACU Vitals:  Vitals Value Taken Time   Temp 96.5 °F (35.8 °C) 01/28/25 1118   Pulse 94 01/28/25 1118   BP 85/52 01/28/25 1118   Resp 16 01/28/25 1118   SpO2 97 % 01/28/25 1118       Modified Aakash:     Vitals Value Taken Time   Activity 2 01/28/25 1118   Respiration 2 01/28/25 1118   Circulation 2 01/28/25 1118   Consciousness 1 01/28/25 1118   Oxygen Saturation 2 01/28/25 1118     Modified Aakash Score: 9

## 2025-01-28 NOTE — ANESTHESIA PREPROCEDURE EVALUATION
Procedure:  COLONOSCOPY    6/2021:  SUMMARY     LEFT VENTRICLE:  Size was normal.  Systolic function was normal. Ejection fraction was estimated to be 60 %.  Wall thickness was normal.  Left ventricular diastolic function parameters were normal.     RIGHT VENTRICLE:  The size was normal.  Systolic function was normal.     MITRAL VALVE:  There was trace regurgitation.     TRICUSPID VALVE:  There was mild regurgitation.      Relevant Problems   CARDIO   (+) Elevated blood pressure, situational   (+) Elevated cholesterol   (+) Mitral regurgitation      NEURO/PSYCH   (+) Generalized anxiety disorder     Patient perimenopausal (lmost recent cycle this month), reports home pregnancy test 2 weeks ago. Reports sexual activity with  only who has had vasectomy     Meds:  Magnesium caps    METS:  >4METS    NPO?:  Sunday for food  4am for liquid    Physical Exam    Airway    Mallampati score: II         Dental   No notable dental hx     Cardiovascular  Cardiovascular exam normal    Pulmonary  Pulmonary exam normal     Other Findings  post-pubertal.      Anesthesia Plan  ASA Score- 2     Anesthesia Type- IV sedation with anesthesia with ASA Monitors.         Additional Monitors:     Airway Plan:            Plan Factors-    Chart reviewed. EKG reviewed.   Patient summary reviewed.                  Induction- intravenous.    Postoperative Plan-         Informed Consent- Anesthetic plan and risks discussed with patient.  I personally reviewed this patient with the CRNA. Discussed and agreed on the Anesthesia Plan with the CRNA..      NPO Status:  Vitals Value Taken Time   Date of last liquid 01/28/25 01/28/25 1012   Time of last liquid 0420 01/28/25 1012   Date of last solid 01/26/25 01/28/25 1012   Time of last solid

## 2025-02-14 ENCOUNTER — TELEPHONE (OUTPATIENT)
Age: 55
End: 2025-02-14

## 2025-02-25 ENCOUNTER — OFFICE VISIT (OUTPATIENT)
Dept: FAMILY MEDICINE CLINIC | Facility: CLINIC | Age: 55
End: 2025-02-25

## 2025-02-25 VITALS
HEART RATE: 96 BPM | BODY MASS INDEX: 31.96 KG/M2 | DIASTOLIC BLOOD PRESSURE: 72 MMHG | OXYGEN SATURATION: 98 % | SYSTOLIC BLOOD PRESSURE: 129 MMHG | TEMPERATURE: 98.3 F | WEIGHT: 180.4 LBS | RESPIRATION RATE: 18 BRPM

## 2025-02-25 DIAGNOSIS — R03.0 ELEVATED BLOOD PRESSURE, SITUATIONAL: ICD-10-CM

## 2025-02-25 DIAGNOSIS — F41.1 GENERALIZED ANXIETY DISORDER: Primary | ICD-10-CM

## 2025-02-25 DIAGNOSIS — Z13.6 SCREENING FOR CARDIOVASCULAR CONDITION: ICD-10-CM

## 2025-02-25 DIAGNOSIS — D69.1 ABNORMAL PLATELETS (HCC): ICD-10-CM

## 2025-02-25 DIAGNOSIS — R41.3 MEMORY CHANGE: ICD-10-CM

## 2025-02-25 DIAGNOSIS — E55.9 VITAMIN D DEFICIENCY: ICD-10-CM

## 2025-02-25 DIAGNOSIS — Z84.89 FAMILY HISTORY OF BRAIN TUMOR: ICD-10-CM

## 2025-02-25 PROCEDURE — 99214 OFFICE O/P EST MOD 30 MIN: CPT | Performed by: FAMILY MEDICINE

## 2025-02-25 NOTE — ASSESSMENT & PLAN NOTE
Patient is complaining of memory changes.  She is worried because her grandfather had similar symptoms when he was diagnosed with brain tumor.  She is also complaining of headaches and increased sensitivity of light.  Given family history of headache, will check MRI brain.  Will also check vitamin B12 and TSH levels.  Memory change could also be secondary to stress at home.  Patient to follow-up in 6 weeks for detailed memory evaluation at that time.  Orders:    Vitamin B12; Future    TSH, 3rd generation with Free T4 reflex; Future    MRI brain w wo contrast; Future

## 2025-02-25 NOTE — ASSESSMENT & PLAN NOTE
CHE-7 score 6, PHQ-9 score 8.  Patient is having considerable stress at home.  She reports that she is managing at this time with her belief in God.  But if she needs help, she will follow-up.  I offered pharmacotherapy and psychotherapy, patient would like to hold off on any kind of treatment at this time.

## 2025-02-25 NOTE — PROGRESS NOTES
Name: Tatiana Nichols      : 1970      MRN: [id number]  Encounter Provider: Mary Timmons MD  Encounter Date: 2025   Encounter department: [facility] [place]  :  Assessment & Plan  Generalized anxiety disorder  CHE-7 score 6, PHQ-9 score 8.  Patient is having considerable stress at home.  She reports that she is managing at this time with her belief in God.  But if she needs help, she will follow-up.  I offered pharmacotherapy and psychotherapy, patient would like to hold off on any kind of treatment at this time.       Elevated blood pressure, situational  Blood pressure at goal today.  Will continue to monitor       Memory change  Patient is complaining of memory changes.  She is worried because her grandfather had similar symptoms when he was diagnosed with brain tumor.  She is also complaining of headaches and increased sensitivity of light.  Given family history of headache, will check MRI brain.  Will also check vitamin B12 and TSH levels.  Memory change could also be secondary to stress at home.  Patient to follow-up in 6 weeks for detailed memory evaluation at that time.  Orders:    Vitamin B12; Future    TSH, 3rd generation with Free T4 reflex; Future    MRI brain w wo contrast; Future    Family history of brain tumor    Orders:    MRI brain w wo contrast; Future    Abnormal platelets (HCC)    Orders:    CBC and differential; Future    Screening for cardiovascular condition    Orders:    Comprehensive metabolic panel; Future    Lipid panel; Future    Vitamin D deficiency    Orders:    Vitamin D 25 hydroxy; Future           History of Present Illness   Tatiana presented to office for a follow-up visit of palpitations, episodic hypertension.  She reports she has been doing well.  She recently got colonoscopy done.  She would like l annual abs to be done.  She reports that she has been forgetting things, has difficulty concentrating.  She is worried because her  grandfather had brain tumor at the age of 51, which was in the frontal lobe and caused lots of memory issues before presentation.  She is also complaining of headache, increased sensitivity to light.  She even saw ophthalmology for this.  She is also having a lot of stress at home these days which could be contributing to her forgetting things.  She says that God and [employer] are her main support and help her every day.      Review of Systems   Constitutional:  Negative for chills and fever.   HENT:  Negative for congestion.    Respiratory:  Negative for shortness of breath.    Cardiovascular:  Negative for chest pain.   Gastrointestinal:  Negative for diarrhea, nausea and vomiting.   Genitourinary:  Negative for difficulty urinating.   Neurological:  Positive for headaches.       Objective   /72 (BP Location: Left arm, Patient Position: Sitting, Cuff Size: Standard)   Pulse 96   Temp 98.3 °F (36.8 °C) (Temporal)   Resp 18   Wt 81.8 kg (180 lb 6.4 oz)   SpO2 98%   BMI 31.96 kg/m²      Physical Exam  Constitutional:       Appearance: She is well-developed.   HENT:      Head: Normocephalic and atraumatic.      Right Ear: External ear normal.      Left Ear: External ear normal.   Eyes:      Conjunctiva/sclera: Conjunctivae normal.      Pupils: Pupils are equal, round, and reactive to light.   Cardiovascular:      Rate and Rhythm: Normal rate and regular rhythm.      Heart sounds: Normal heart sounds. No murmur heard.     No friction rub.   Pulmonary:      Effort: Pulmonary effort is normal. No respiratory distress.      Breath sounds: Normal breath sounds. No wheezing or rales.   Abdominal:      General: Bowel sounds are normal. There is no distension.      Palpations: Abdomen is soft.      Tenderness: There is no abdominal tenderness.   Musculoskeletal:         General: Normal range of motion.      Cervical back: Normal range of motion and neck supple.   Skin:     General: Skin is warm and dry.    Neurological:      General: No focal deficit present.      Mental Status: She is alert and oriented to person, place, and time. Mental status is at baseline.      Cranial Nerves: No cranial nerve deficit.      Sensory: No sensory deficit.      Motor: No weakness.      Coordination: Coordination normal.      Gait: Gait normal.      Deep Tendon Reflexes: Reflexes normal.

## 2025-02-26 ENCOUNTER — TELEPHONE (OUTPATIENT)
Dept: FAMILY MEDICINE CLINIC | Facility: CLINIC | Age: 55
End: 2025-02-26

## 2025-02-26 NOTE — TELEPHONE ENCOUNTER
Folder (To be completed by) -  Dr. Timmons     Name of Form - Patient Health Questionnaire and General Anxiety Disorder    Color folder (

## 2025-03-21 ENCOUNTER — HOSPITAL ENCOUNTER (OUTPATIENT)
Dept: RADIOLOGY | Facility: HOSPITAL | Age: 55
Discharge: HOME | End: 2025-03-21
Attending: FAMILY MEDICINE
Payer: COMMERCIAL

## 2025-03-21 DIAGNOSIS — R41.3 MEMORY CHANGE: ICD-10-CM

## 2025-03-21 DIAGNOSIS — Z84.89 FAMILY HISTORY OF BRAIN TUMOR: ICD-10-CM

## 2025-03-21 PROCEDURE — 70551 MRI BRAIN STEM W/O DYE: CPT

## 2025-03-24 ENCOUNTER — RESULTS FOLLOW-UP (OUTPATIENT)
Dept: FAMILY MEDICINE CLINIC | Facility: CLINIC | Age: 55
End: 2025-03-24

## 2025-04-02 ENCOUNTER — RA CDI HCC (OUTPATIENT)
Dept: OTHER | Facility: HOSPITAL | Age: 55
End: 2025-04-02

## 2025-04-04 LAB
25(OH)D3+25(OH)D2 SERPL-MCNC: 44 NG/ML (ref 30–100)
ALBUMIN SERPL-MCNC: 4.5 G/DL (ref 3.5–5.7)
ALP SERPL-CCNC: 53 U/L (ref 35–120)
ALT SERPL-CCNC: 13 U/L
ANION GAP SERPL CALCULATED.3IONS-SCNC: 10 MMOL/L (ref 3–11)
AST SERPL-CCNC: 14 U/L
BASOPHILS # BLD AUTO: 0.1 THOU/CMM (ref 0–0.1)
BASOPHILS NFR BLD AUTO: 1 %
BILIRUB SERPL-MCNC: 0.6 MG/DL (ref 0.2–1)
BUN SERPL-MCNC: 13 MG/DL (ref 7–25)
CALCIUM SERPL-MCNC: 9.3 MG/DL (ref 8.5–10.5)
CHLORIDE SERPL-SCNC: 103 MMOL/L (ref 100–109)
CHOLEST SERPL-MCNC: 241 MG/DL
CHOLEST/HDLC SERPL: 3 {RATIO}
CO2 SERPL-SCNC: 24 MMOL/L (ref 21–31)
CREAT SERPL-MCNC: 0.81 MG/DL (ref 0.4–1.1)
CYTOLOGY CMNT CVX/VAG CYTO-IMP: NORMAL
DIFFERENTIAL METHOD BLD: ABNORMAL
EOSINOPHIL # BLD AUTO: 0.2 THOU/CMM (ref 0–0.5)
EOSINOPHIL NFR BLD AUTO: 2 %
ERYTHROCYTE [DISTWIDTH] IN BLOOD BY AUTOMATED COUNT: 15.1 % (ref 12–16)
GFR/BSA.PRED SERPLBLD CYS-BASED-ARV: 87 ML/MIN/{1.73_M2}
GLUCOSE SERPL-MCNC: 88 MG/DL (ref 65–99)
HCT VFR BLD AUTO: 41 % (ref 35–43)
HDLC SERPL-MCNC: 80 MG/DL (ref 23–92)
HGB BLD-MCNC: 13.7 G/DL (ref 11.5–14.5)
LDLC SERPL CALC-MCNC: 142 MG/DL
LYMPHOCYTES # BLD AUTO: 2.2 THOU/CMM (ref 1–3)
LYMPHOCYTES NFR BLD AUTO: 30 %
MCH RBC QN AUTO: 30.3 PG (ref 26–34)
MCHC RBC AUTO-ENTMCNC: 33.4 G/DL (ref 32–37)
MCV RBC AUTO: 91 FL (ref 80–100)
MONOCYTES # BLD AUTO: 1 THOU/CMM (ref 0.3–1)
MONOCYTES NFR BLD AUTO: 13 %
NEUTROPHILS # BLD AUTO: 4.1 THOU/CMM (ref 1.8–7.8)
NEUTROPHILS NFR BLD AUTO: 54 %
NONHDLC SERPL-MCNC: 161 MG/DL
PLATELET # BLD AUTO: 370 THOU/CMM (ref 140–350)
PMV BLD REES-ECKER: 8 FL (ref 7.5–11.3)
POTASSIUM SERPL-SCNC: 4 MMOL/L (ref 3.5–5.2)
PROT SERPL-MCNC: 7.1 G/DL (ref 6.3–8.3)
RBC # BLD AUTO: 4.53 MILL/CMM (ref 3.7–4.7)
SODIUM SERPL-SCNC: 137 MMOL/L (ref 135–145)
TRIGL SERPL-MCNC: 94 MG/DL
TSH SERPL-ACNC: 1.38 UIU/ML (ref 0.45–5.33)
VIT B12 SERPL-MCNC: 613 PG/ML (ref 180–914)
WBC # BLD AUTO: 7.5 THOU/CMM (ref 4–10)

## 2025-04-08 ENCOUNTER — RESULTS FOLLOW-UP (OUTPATIENT)
Dept: FAMILY MEDICINE CLINIC | Facility: CLINIC | Age: 55
End: 2025-04-08

## 2025-07-28 ENCOUNTER — NURSE TRIAGE (OUTPATIENT)
Age: 55
End: 2025-07-28